# Patient Record
Sex: FEMALE | Race: BLACK OR AFRICAN AMERICAN | NOT HISPANIC OR LATINO | Employment: OTHER | URBAN - METROPOLITAN AREA
[De-identification: names, ages, dates, MRNs, and addresses within clinical notes are randomized per-mention and may not be internally consistent; named-entity substitution may affect disease eponyms.]

---

## 2017-01-06 PROBLEM — M10.062 ACUTE IDIOPATHIC GOUT OF LEFT KNEE: Status: ACTIVE | Noted: 2017-01-06

## 2017-01-06 PROBLEM — M1A.9XX0 CHRONIC GOUT: Status: ACTIVE | Noted: 2017-01-06

## 2017-01-06 PROBLEM — E11.9 TYPE 2 DIABETES MELLITUS WITHOUT COMPLICATION, WITHOUT LONG-TERM CURRENT USE OF INSULIN: Status: ACTIVE | Noted: 2017-01-06

## 2017-02-20 PROBLEM — E78.00 HIGH CHOLESTEROL: Status: ACTIVE | Noted: 2017-02-20

## 2017-02-20 PROBLEM — I10 HYPERTENSION: Status: ACTIVE | Noted: 2017-02-20

## 2017-02-27 PROBLEM — E11.9 TYPE 2 DIABETES MELLITUS WITHOUT COMPLICATION, WITHOUT LONG-TERM CURRENT USE OF INSULIN: Status: RESOLVED | Noted: 2017-01-06 | Resolved: 2017-02-27

## 2017-05-05 PROBLEM — E78.5 HYPERLIPIDEMIA: Status: ACTIVE | Noted: 2017-05-05

## 2017-05-05 PROBLEM — R80.9 PROTEINURIA: Status: ACTIVE | Noted: 2017-05-05

## 2017-10-27 ENCOUNTER — HOSPITAL ENCOUNTER (INPATIENT)
Facility: HOSPITAL | Age: 67
LOS: 3 days | Discharge: HOME | DRG: 690 | End: 2017-10-30
Attending: EMERGENCY MEDICINE | Admitting: HOSPITALIST
Payer: MEDICARE

## 2017-10-27 DIAGNOSIS — R10.9 ABDOMINAL PAIN: ICD-10-CM

## 2017-10-27 DIAGNOSIS — R07.9 CHEST PAIN: ICD-10-CM

## 2017-10-27 DIAGNOSIS — I24.9 ACS (ACUTE CORONARY SYNDROME): ICD-10-CM

## 2017-10-27 DIAGNOSIS — I10 ESSENTIAL HYPERTENSION: ICD-10-CM

## 2017-10-27 DIAGNOSIS — E11.9 TYPE 2 DIABETES MELLITUS WITHOUT COMPLICATION, WITHOUT LONG-TERM CURRENT USE OF INSULIN: ICD-10-CM

## 2017-10-27 DIAGNOSIS — N30.00 ACUTE CYSTITIS WITHOUT HEMATURIA: ICD-10-CM

## 2017-10-27 DIAGNOSIS — E86.0 DEHYDRATION: Primary | ICD-10-CM

## 2017-10-27 DIAGNOSIS — K21.9 GASTROESOPHAGEAL REFLUX DISEASE, ESOPHAGITIS PRESENCE NOT SPECIFIED: ICD-10-CM

## 2017-10-27 DIAGNOSIS — I24.9 ACUTE CORONARY SYNDROME: ICD-10-CM

## 2017-10-27 DIAGNOSIS — N17.9 ACUTE KIDNEY INJURY: ICD-10-CM

## 2017-10-27 LAB
ALBUMIN SERPL BCP-MCNC: 4 G/DL
ALP SERPL-CCNC: 80 U/L
ALT SERPL W/O P-5'-P-CCNC: 11 U/L
ANION GAP SERPL CALC-SCNC: 14 MMOL/L
AST SERPL-CCNC: 15 U/L
BACTERIA #/AREA URNS HPF: ABNORMAL /HPF
BASOPHILS # BLD AUTO: 0.03 K/UL
BASOPHILS NFR BLD: 0.3 %
BILIRUB SERPL-MCNC: 0.7 MG/DL
BILIRUB UR QL STRIP: NEGATIVE
BUN SERPL-MCNC: 52 MG/DL
CALCIUM SERPL-MCNC: 10.5 MG/DL
CHLORIDE SERPL-SCNC: 104 MMOL/L
CLARITY UR: ABNORMAL
CO2 SERPL-SCNC: 22 MMOL/L
COLOR UR: YELLOW
CREAT SERPL-MCNC: 1.7 MG/DL
DIFFERENTIAL METHOD: ABNORMAL
EOSINOPHIL # BLD AUTO: 0 K/UL
EOSINOPHIL NFR BLD: 0.1 %
ERYTHROCYTE [DISTWIDTH] IN BLOOD BY AUTOMATED COUNT: 14.2 %
EST. GFR  (AFRICAN AMERICAN): 35 ML/MIN/1.73 M^2
EST. GFR  (NON AFRICAN AMERICAN): 31 ML/MIN/1.73 M^2
GLUCOSE SERPL-MCNC: 150 MG/DL
GLUCOSE UR QL STRIP: NEGATIVE
HCT VFR BLD AUTO: 35.4 %
HGB BLD-MCNC: 12.2 G/DL
HGB UR QL STRIP: NEGATIVE
HYALINE CASTS #/AREA URNS LPF: 0 /LPF
KETONES UR QL STRIP: NEGATIVE
LEUKOCYTE ESTERASE UR QL STRIP: ABNORMAL
LIPASE SERPL-CCNC: 12 U/L
LYMPHOCYTES # BLD AUTO: 1.5 K/UL
LYMPHOCYTES NFR BLD: 16.1 %
MCH RBC QN AUTO: 29.8 PG
MCHC RBC AUTO-ENTMCNC: 34.5 G/DL
MCV RBC AUTO: 87 FL
MICROSCOPIC COMMENT: ABNORMAL
MONOCYTES # BLD AUTO: 1.1 K/UL
MONOCYTES NFR BLD: 11.5 %
NEUTROPHILS # BLD AUTO: 6.7 K/UL
NEUTROPHILS NFR BLD: 72 %
NITRITE UR QL STRIP: NEGATIVE
PH UR STRIP: 5 [PH] (ref 5–8)
PLATELET # BLD AUTO: 248 K/UL
PMV BLD AUTO: 10.8 FL
POCT GLUCOSE: 147 MG/DL (ref 70–110)
POTASSIUM SERPL-SCNC: 4 MMOL/L
PROT SERPL-MCNC: 7.9 G/DL
PROT UR QL STRIP: ABNORMAL
RBC # BLD AUTO: 4.09 M/UL
RBC #/AREA URNS HPF: 2 /HPF (ref 0–4)
SODIUM SERPL-SCNC: 140 MMOL/L
SP GR UR STRIP: 1.01 (ref 1–1.03)
SQUAMOUS #/AREA URNS HPF: 15 /HPF
TROPONIN I SERPL DL<=0.01 NG/ML-MCNC: 0.02 NG/ML
TROPONIN I SERPL DL<=0.01 NG/ML-MCNC: 0.03 NG/ML
TROPONIN I SERPL DL<=0.01 NG/ML-MCNC: 0.04 NG/ML
URN SPEC COLLECT METH UR: ABNORMAL
UROBILINOGEN UR STRIP-ACNC: NEGATIVE EU/DL
WBC # BLD AUTO: 9.37 K/UL
WBC #/AREA URNS HPF: 20 /HPF (ref 0–5)

## 2017-10-27 PROCEDURE — 63600175 PHARM REV CODE 636 W HCPCS: Performed by: HOSPITALIST

## 2017-10-27 PROCEDURE — 80053 COMPREHEN METABOLIC PANEL: CPT

## 2017-10-27 PROCEDURE — 99285 EMERGENCY DEPT VISIT HI MDM: CPT | Mod: 25

## 2017-10-27 PROCEDURE — 85025 COMPLETE CBC W/AUTO DIFF WBC: CPT

## 2017-10-27 PROCEDURE — 25000003 PHARM REV CODE 250: Performed by: EMERGENCY MEDICINE

## 2017-10-27 PROCEDURE — 63600175 PHARM REV CODE 636 W HCPCS: Performed by: EMERGENCY MEDICINE

## 2017-10-27 PROCEDURE — 36415 COLL VENOUS BLD VENIPUNCTURE: CPT

## 2017-10-27 PROCEDURE — 96375 TX/PRO/DX INJ NEW DRUG ADDON: CPT

## 2017-10-27 PROCEDURE — 83690 ASSAY OF LIPASE: CPT

## 2017-10-27 PROCEDURE — 25000003 PHARM REV CODE 250: Performed by: HOSPITALIST

## 2017-10-27 PROCEDURE — 93005 ELECTROCARDIOGRAM TRACING: CPT

## 2017-10-27 PROCEDURE — 93010 ELECTROCARDIOGRAM REPORT: CPT | Mod: ,,, | Performed by: INTERNAL MEDICINE

## 2017-10-27 PROCEDURE — 96365 THER/PROPH/DIAG IV INF INIT: CPT

## 2017-10-27 PROCEDURE — C9113 INJ PANTOPRAZOLE SODIUM, VIA: HCPCS | Performed by: HOSPITALIST

## 2017-10-27 PROCEDURE — 84484 ASSAY OF TROPONIN QUANT: CPT | Mod: 91

## 2017-10-27 PROCEDURE — 21400001 HC TELEMETRY ROOM

## 2017-10-27 PROCEDURE — 83036 HEMOGLOBIN GLYCOSYLATED A1C: CPT

## 2017-10-27 PROCEDURE — 87040 BLOOD CULTURE FOR BACTERIA: CPT | Mod: 59

## 2017-10-27 PROCEDURE — 96361 HYDRATE IV INFUSION ADD-ON: CPT

## 2017-10-27 PROCEDURE — 81000 URINALYSIS NONAUTO W/SCOPE: CPT

## 2017-10-27 RX ORDER — CARVEDILOL 6.25 MG/1
6.25 TABLET ORAL 2 TIMES DAILY
Status: DISCONTINUED | OUTPATIENT
Start: 2017-10-27 | End: 2017-10-30 | Stop reason: HOSPADM

## 2017-10-27 RX ORDER — PANTOPRAZOLE SODIUM 40 MG/10ML
40 INJECTION, POWDER, LYOPHILIZED, FOR SOLUTION INTRAVENOUS DAILY
Status: DISCONTINUED | OUTPATIENT
Start: 2017-10-27 | End: 2017-10-30 | Stop reason: HOSPADM

## 2017-10-27 RX ORDER — AMOXICILLIN 250 MG
1 CAPSULE ORAL DAILY
Status: DISCONTINUED | OUTPATIENT
Start: 2017-10-28 | End: 2017-10-30 | Stop reason: HOSPADM

## 2017-10-27 RX ORDER — SODIUM CHLORIDE 9 MG/ML
1000 INJECTION, SOLUTION INTRAVENOUS
Status: COMPLETED | OUTPATIENT
Start: 2017-10-27 | End: 2017-10-27

## 2017-10-27 RX ORDER — ALLOPURINOL 100 MG/1
300 TABLET ORAL DAILY
Status: DISCONTINUED | OUTPATIENT
Start: 2017-10-28 | End: 2017-10-30 | Stop reason: HOSPADM

## 2017-10-27 RX ORDER — INSULIN ASPART 100 [IU]/ML
0-5 INJECTION, SOLUTION INTRAVENOUS; SUBCUTANEOUS
Status: DISCONTINUED | OUTPATIENT
Start: 2017-10-27 | End: 2017-10-30 | Stop reason: HOSPADM

## 2017-10-27 RX ORDER — CIPROFLOXACIN 2 MG/ML
400 INJECTION, SOLUTION INTRAVENOUS
Status: DISCONTINUED | OUTPATIENT
Start: 2017-10-28 | End: 2017-10-30 | Stop reason: HOSPADM

## 2017-10-27 RX ORDER — ASPIRIN 325 MG
325 TABLET, DELAYED RELEASE (ENTERIC COATED) ORAL
Status: COMPLETED | OUTPATIENT
Start: 2017-10-27 | End: 2017-10-27

## 2017-10-27 RX ORDER — ACETAMINOPHEN 325 MG/1
650 TABLET ORAL EVERY 6 HOURS PRN
Status: DISCONTINUED | OUTPATIENT
Start: 2017-10-27 | End: 2017-10-30 | Stop reason: HOSPADM

## 2017-10-27 RX ORDER — SODIUM CHLORIDE 9 MG/ML
1000 INJECTION, SOLUTION INTRAVENOUS CONTINUOUS
Status: ACTIVE | OUTPATIENT
Start: 2017-10-27 | End: 2017-10-28

## 2017-10-27 RX ORDER — GLUCAGON 1 MG
1 KIT INJECTION
Status: DISCONTINUED | OUTPATIENT
Start: 2017-10-27 | End: 2017-10-30 | Stop reason: HOSPADM

## 2017-10-27 RX ORDER — PRAVASTATIN SODIUM 40 MG/1
40 TABLET ORAL DAILY
Status: DISCONTINUED | OUTPATIENT
Start: 2017-10-28 | End: 2017-10-30 | Stop reason: HOSPADM

## 2017-10-27 RX ORDER — ENOXAPARIN SODIUM 100 MG/ML
30 INJECTION SUBCUTANEOUS EVERY 24 HOURS
Status: DISCONTINUED | OUTPATIENT
Start: 2017-10-27 | End: 2017-10-30 | Stop reason: HOSPADM

## 2017-10-27 RX ORDER — IBUPROFEN 200 MG
16 TABLET ORAL
Status: DISCONTINUED | OUTPATIENT
Start: 2017-10-27 | End: 2017-10-30 | Stop reason: HOSPADM

## 2017-10-27 RX ORDER — ONDANSETRON 2 MG/ML
4 INJECTION INTRAMUSCULAR; INTRAVENOUS EVERY 6 HOURS PRN
Status: DISCONTINUED | OUTPATIENT
Start: 2017-10-27 | End: 2017-10-30 | Stop reason: HOSPADM

## 2017-10-27 RX ORDER — CIPROFLOXACIN 2 MG/ML
400 INJECTION, SOLUTION INTRAVENOUS
Status: COMPLETED | OUTPATIENT
Start: 2017-10-27 | End: 2017-10-27

## 2017-10-27 RX ORDER — RAMELTEON 8 MG/1
8 TABLET ORAL NIGHTLY PRN
Status: DISCONTINUED | OUTPATIENT
Start: 2017-10-27 | End: 2017-10-30 | Stop reason: HOSPADM

## 2017-10-27 RX ORDER — DICYCLOMINE HYDROCHLORIDE 10 MG/1
10 CAPSULE ORAL 4 TIMES DAILY
Status: DISCONTINUED | OUTPATIENT
Start: 2017-10-28 | End: 2017-10-30 | Stop reason: HOSPADM

## 2017-10-27 RX ORDER — POLYETHYLENE GLYCOL 3350 17 G/17G
17 POWDER, FOR SOLUTION ORAL DAILY
Status: DISCONTINUED | OUTPATIENT
Start: 2017-10-28 | End: 2017-10-30 | Stop reason: HOSPADM

## 2017-10-27 RX ORDER — IBUPROFEN 200 MG
24 TABLET ORAL
Status: DISCONTINUED | OUTPATIENT
Start: 2017-10-27 | End: 2017-10-30 | Stop reason: HOSPADM

## 2017-10-27 RX ORDER — ONDANSETRON 2 MG/ML
4 INJECTION INTRAMUSCULAR; INTRAVENOUS
Status: COMPLETED | OUTPATIENT
Start: 2017-10-27 | End: 2017-10-27

## 2017-10-27 RX ADMIN — PANTOPRAZOLE SODIUM 40 MG: 40 INJECTION, POWDER, FOR SOLUTION INTRAVENOUS at 08:10

## 2017-10-27 RX ADMIN — ASPIRIN 325 MG: 325 TABLET, DELAYED RELEASE ORAL at 08:10

## 2017-10-27 RX ADMIN — CIPROFLOXACIN 400 MG: 2 INJECTION, SOLUTION INTRAVENOUS at 08:10

## 2017-10-27 RX ADMIN — SODIUM CHLORIDE 1000 ML: 0.9 INJECTION, SOLUTION INTRAVENOUS at 08:10

## 2017-10-27 RX ADMIN — ONDANSETRON 4 MG: 2 INJECTION, SOLUTION INTRAMUSCULAR; INTRAVENOUS at 06:10

## 2017-10-27 RX ADMIN — ENOXAPARIN SODIUM 30 MG: 100 INJECTION SUBCUTANEOUS at 04:10

## 2017-10-27 RX ADMIN — DICYCLOMINE HYDROCHLORIDE 10 MG: 10 CAPSULE ORAL at 11:10

## 2017-10-27 RX ADMIN — CARVEDILOL 6.25 MG: 6.25 TABLET, FILM COATED ORAL at 08:10

## 2017-10-27 RX ADMIN — SODIUM CHLORIDE 1000 ML: 0.9 INJECTION, SOLUTION INTRAVENOUS at 06:10

## 2017-10-27 RX ADMIN — ONDANSETRON 4 MG: 2 INJECTION, SOLUTION INTRAMUSCULAR; INTRAVENOUS at 04:10

## 2017-10-27 RX ADMIN — SODIUM CHLORIDE 1000 ML: 0.9 INJECTION, SOLUTION INTRAVENOUS at 10:10

## 2017-10-27 NOTE — ED NOTES
Reported from Francine WILDER . Pt is lying in bed, AAOx3, placed on continuous cardiac monitoring, pulse O2, blood pressure, side rails upx2. Will continue to monitor.

## 2017-10-27 NOTE — ED PROVIDER NOTES
Encounter Date: 10/27/2017    SCRIBE #1 NOTE: I, Herson Selena, am scribing for, and in the presence of, Chevy Farias MD. Other sections scribed: HPI, ROS, PE.       History     Chief Complaint   Patient presents with    Abdominal Pain     nausea and vomiting x 1 week, reports generalized abdominal pain, denies diarrhea     CC: Abdominal Pain  HPI: This 67 y.o. female with Hx of HTN, HLD, DM, CAD presents to the ED c/o severe intermittent upper abdominal pain for the past week. Pt states that she was seen at an urgent care center for this 5 days ago and was told she was constipated; she reports having XRs done, but no blood work. She reports having normal non-bloody BMs after taking Dulcolax as directed, but she continues to have the pain. Pt states that she has been feeling nauseous and vomits after she eats, drinks, or takes her medications (has not taken any in 4-5 days). She states that she has not been eating or drinking for the past few days. She reports episodes of bilious non-bloody emesis 5 days ago, 4 days ago, and then again this morning. She denies fever, diarrhea, dysuria, chest pain.      The history is provided by the patient.     Review of patient's allergies indicates:   Allergen Reactions    Codeine      Not sure of reaction    Colchicine analogues Nausea And Vomiting    Doxycycline      Not sure of reaction    Equagesic [meprobamate-aspirin]      hyperactivity    Indocin [indomethacin sodium]      Not sure of reaction    Penicillins      Not sure of reaction    Tramadol Nausea And Vomiting    Vicodin [hydrocodone-acetaminophen]      Not sure of reaction     Past Medical History:   Diagnosis Date    Diabetes mellitus     Heart attack     2001, 2006    High cholesterol     Hypertension      Past Surgical History:   Procedure Laterality Date    BREAST SURGERY      HYSTERECTOMY      THYROID CYST EXCISION       History reviewed. No pertinent family history.  Social History    Substance Use Topics    Smoking status: Never Smoker    Smokeless tobacco: Never Used    Alcohol use Yes      Comment: occasional     Review of Systems   Constitutional: Positive for appetite change. Negative for chills and fever.   HENT: Negative for sinus pain and sore throat.    Eyes: Negative for pain and visual disturbance.   Respiratory: Negative for cough and shortness of breath.    Cardiovascular: Negative for chest pain.   Gastrointestinal: Positive for abdominal pain, nausea and vomiting. Negative for blood in stool, constipation and diarrhea.   Genitourinary: Negative for difficulty urinating, dysuria and hematuria.   Musculoskeletal: Negative for arthralgias and myalgias.   Skin: Negative for rash and wound.   Neurological: Negative for syncope and headaches.       Physical Exam     Initial Vitals [10/27/17 0545]   BP Pulse Resp Temp SpO2   (!) 167/71 99 18 98.8 °F (37.1 °C) 99 %      MAP       103         Physical Exam    Nursing note and vitals reviewed.  Constitutional: She appears well-developed and well-nourished. She is not diaphoretic. No distress.   HENT:   Head: Normocephalic and atraumatic.   Eyes: EOM are normal.   Neck: Normal range of motion.   Cardiovascular: Normal rate and regular rhythm.   Pulmonary/Chest: Breath sounds normal. No respiratory distress.   Abdominal: Soft. Bowel sounds are normal. She exhibits no distension and no mass. There is no tenderness. There is no rebound and no guarding.   Musculoskeletal: Normal range of motion. She exhibits no edema.   Neurological: She is alert.   Skin: Skin is warm and dry. No rash noted.   Psychiatric: Thought content normal.         ED Course   Procedures  Labs Reviewed   CBC W/ AUTO DIFFERENTIAL - Abnormal; Notable for the following:        Result Value    Hematocrit 35.4 (*)     Mono # 1.1 (*)     Lymph% 16.1 (*)     All other components within normal limits   COMPREHENSIVE METABOLIC PANEL - Abnormal; Notable for the following:      CO2 22 (*)     Glucose 150 (*)     BUN, Bld 52 (*)     Creatinine 1.7 (*)     eGFR if  35 (*)     eGFR if non  31 (*)     All other components within normal limits   TROPONIN I - Abnormal; Notable for the following:     Troponin I 0.039 (*)     All other components within normal limits   URINALYSIS - Abnormal; Notable for the following:     Appearance, UA Cloudy (*)     Protein, UA 1+ (*)     Leukocytes, UA 2+ (*)     All other components within normal limits   URINALYSIS MICROSCOPIC - Abnormal; Notable for the following:     WBC, UA 20 (*)     Bacteria, UA Many (*)     All other components within normal limits   CULTURE, BLOOD   CULTURE, BLOOD   LIPASE             Medical Decision Making:   Differential Diagnosis:   67-year-old black female history of diabetes gout hypercholesterolemia MI ×2 no stents no recent echoes or stress test presents to the ER complaining of epigastric burning pain nausea vomiting ×2 today this is been on for 5 daysspecific chest pain or short of breath no fevers, limits of the normal workup in the ER reveals a urinary tract infection of glucose of 150 BUN of 52 creatinine 1.7 which is new for her potassium of 4.0 troponin is elevated to 0.039 EKG shows a normal sinus rhythm at 89 with LVH with strain admitted the patient to Dr. pressure, for acute coronary syndrome chest pain rule out MI urinary tract infection dehydration nausea vomiting and acute kidney injury she is here and hemodynamically stable at time of admission she received 325 mg aspirin by mouth 400 mg of Cipro IV and blood cultures were done            Scribe Attestation:   Scribe #1: I performed the above scribed service and the documentation accurately describes the services I performed. I attest to the accuracy of the note.    Attending Attestation:           Physician Attestation for Scribe:  Physician Attestation Statement for Scribe #1: I, Chevy Farias MD, reviewed documentation, as  scribed by Herson Walters in my presence, and it is both accurate and complete.                 ED Course      Clinical Impression:   The primary encounter diagnosis was Dehydration. Diagnoses of Abdominal pain, Chest pain, Acute cystitis without hematuria, Acute coronary syndrome, Acute kidney injury, and ACS (acute coronary syndrome) were also pertinent to this visit.                           Chevy Farias MD  10/27/17 4427

## 2017-10-27 NOTE — PLAN OF CARE
Problem: Diabetes, Type 2 (Adult)  Goal: Signs and Symptoms of Listed Potential Problems Will be Absent, Minimized or Managed (Diabetes, Type 2)  Signs and symptoms of listed potential problems will be absent, minimized or managed by discharge/transition of care (reference Diabetes, Type 2 (Adult) CPG).   Outcome: Ongoing (interventions implemented as appropriate)   10/27/17 1701   Diabetes, Type 2   Problems Assessed (Type 2 Diabetes) all   Problems Present (Type 2 Diabetes) none       Problem: Patient Care Overview  Goal: Plan of Care Review  Outcome: Ongoing (interventions implemented as appropriate)   10/27/17 1701   Coping/Psychosocial   Plan Of Care Reviewed With patient       Problem: Nausea/Vomiting (Adult)  Goal: Identify Related Risk Factors and Signs and Symptoms  Related risk factors and signs and symptoms are identified upon initiation of Human Response Clinical Practice Guideline (CPG)  Outcome: Ongoing (interventions implemented as appropriate)   10/27/17 1701   Nausea/Vomiting   Related Risk Factors (Nausea/Vomiting) gastrointestinal dysfunction   Signs and Symptoms (Nausea/Vomiting) abdominal discomfort/pain;report of queasy sensation;retching/gagging;weakness;food aversion;report of emesis     Goal: Symptom Relief  Patient will demonstrate the desired outcomes by discharge/transition of care.  Outcome: Ongoing (interventions implemented as appropriate)   10/27/17 1701   Nausea/Vomiting (Adult)   Symptom Relief making progress toward outcome     Goal: Adequate Hydration  Patient will demonstrate the desired outcomes by discharge/transition of care.  Outcome: Ongoing (interventions implemented as appropriate)   10/27/17 1701   Nausea/Vomiting (Adult)   Adequate Hydration making progress toward outcome

## 2017-10-27 NOTE — NURSING
Patient arrived to room from ER via weelchair, by transport staff. NADN. Oriented patient to room, unit, and call light. Telemetry monitor in place. Patient denies any needs at this time. Will continue to monitor

## 2017-10-28 PROBLEM — E86.0 DEHYDRATION: Status: ACTIVE | Noted: 2017-10-28

## 2017-10-28 LAB
ANION GAP SERPL CALC-SCNC: 6 MMOL/L
BASOPHILS # BLD AUTO: 0.03 K/UL
BASOPHILS NFR BLD: 0.3 %
BUN SERPL-MCNC: 28 MG/DL
CALCIUM SERPL-MCNC: 9 MG/DL
CHLORIDE SERPL-SCNC: 116 MMOL/L
CO2 SERPL-SCNC: 24 MMOL/L
CREAT SERPL-MCNC: 1.3 MG/DL
DIFFERENTIAL METHOD: ABNORMAL
EOSINOPHIL # BLD AUTO: 0 K/UL
EOSINOPHIL NFR BLD: 0.1 %
ERYTHROCYTE [DISTWIDTH] IN BLOOD BY AUTOMATED COUNT: 14.3 %
EST. GFR  (AFRICAN AMERICAN): 49 ML/MIN/1.73 M^2
EST. GFR  (NON AFRICAN AMERICAN): 43 ML/MIN/1.73 M^2
GLUCOSE SERPL-MCNC: 107 MG/DL
HCT VFR BLD AUTO: 28 %
HGB BLD-MCNC: 9.3 G/DL
LYMPHOCYTES # BLD AUTO: 1.9 K/UL
LYMPHOCYTES NFR BLD: 18.3 %
MCH RBC QN AUTO: 30.1 PG
MCHC RBC AUTO-ENTMCNC: 33.2 G/DL
MCV RBC AUTO: 91 FL
MONOCYTES # BLD AUTO: 1.1 K/UL
MONOCYTES NFR BLD: 10 %
NEUTROPHILS # BLD AUTO: 7.5 K/UL
NEUTROPHILS NFR BLD: 71.1 %
PLATELET # BLD AUTO: 187 K/UL
PMV BLD AUTO: 10.8 FL
POCT GLUCOSE: 105 MG/DL (ref 70–110)
POCT GLUCOSE: 106 MG/DL (ref 70–110)
POCT GLUCOSE: 113 MG/DL (ref 70–110)
POCT GLUCOSE: 125 MG/DL (ref 70–110)
POTASSIUM SERPL-SCNC: 4.9 MMOL/L
RBC # BLD AUTO: 3.09 M/UL
SODIUM SERPL-SCNC: 146 MMOL/L
WBC # BLD AUTO: 10.59 K/UL

## 2017-10-28 PROCEDURE — 21400001 HC TELEMETRY ROOM

## 2017-10-28 PROCEDURE — 87086 URINE CULTURE/COLONY COUNT: CPT

## 2017-10-28 PROCEDURE — 36415 COLL VENOUS BLD VENIPUNCTURE: CPT

## 2017-10-28 PROCEDURE — 63600175 PHARM REV CODE 636 W HCPCS: Performed by: HOSPITALIST

## 2017-10-28 PROCEDURE — 25000003 PHARM REV CODE 250: Performed by: HOSPITALIST

## 2017-10-28 PROCEDURE — C9113 INJ PANTOPRAZOLE SODIUM, VIA: HCPCS | Performed by: HOSPITALIST

## 2017-10-28 PROCEDURE — 63600175 PHARM REV CODE 636 W HCPCS: Performed by: EMERGENCY MEDICINE

## 2017-10-28 PROCEDURE — 85025 COMPLETE CBC W/AUTO DIFF WBC: CPT

## 2017-10-28 PROCEDURE — 80048 BASIC METABOLIC PNL TOTAL CA: CPT

## 2017-10-28 RX ADMIN — ALLOPURINOL 300 MG: 100 TABLET ORAL at 10:10

## 2017-10-28 RX ADMIN — PRAVASTATIN SODIUM 40 MG: 40 TABLET ORAL at 10:10

## 2017-10-28 RX ADMIN — DICYCLOMINE HYDROCHLORIDE 10 MG: 10 CAPSULE ORAL at 06:10

## 2017-10-28 RX ADMIN — ENOXAPARIN SODIUM 30 MG: 100 INJECTION SUBCUTANEOUS at 05:10

## 2017-10-28 RX ADMIN — PANTOPRAZOLE SODIUM 40 MG: 40 INJECTION, POWDER, FOR SOLUTION INTRAVENOUS at 10:10

## 2017-10-28 RX ADMIN — CIPROFLOXACIN 400 MG: 2 INJECTION, SOLUTION INTRAVENOUS at 10:10

## 2017-10-28 RX ADMIN — DICYCLOMINE HYDROCHLORIDE 10 MG: 10 CAPSULE ORAL at 01:10

## 2017-10-28 RX ADMIN — CARVEDILOL 6.25 MG: 6.25 TABLET, FILM COATED ORAL at 10:10

## 2017-10-28 RX ADMIN — POLYETHYLENE GLYCOL 3350 17 G: 17 POWDER, FOR SOLUTION ORAL at 10:10

## 2017-10-28 RX ADMIN — DOCUSATE SODIUM AND SENNOSIDES 1 TABLET: 8.6; 5 TABLET, FILM COATED ORAL at 10:10

## 2017-10-28 RX ADMIN — DICYCLOMINE HYDROCHLORIDE 10 MG: 10 CAPSULE ORAL at 11:10

## 2017-10-28 RX ADMIN — CARVEDILOL 6.25 MG: 6.25 TABLET, FILM COATED ORAL at 09:10

## 2017-10-28 RX ADMIN — DICYCLOMINE HYDROCHLORIDE 10 MG: 10 CAPSULE ORAL at 05:10

## 2017-10-28 NOTE — PLAN OF CARE
Problem: Nausea/Vomiting (Adult)  Intervention: Minimize Nausea Triggers/Manage Symptoms   10/27/17 1030 10/28/17 0648   Coping/Psychosocial Interventions   Environmental Support --  calm environment promoted   Monitor/Manage Hypovolemia   Nausea/Vomiting Interventions sips clear liquids given --      Intervention: Promote/Maintain Hydration   10/28/17 0648   Nutrition Interventions   Fluid/Electrolyte Management electrolyte binding therapy initiated;fluids provided         Comments: Pt admitted with abd pain she has not had any nausea or vomiting, since admission.pt given bentyl as ordered.iv fluids was given as ordered. Pt npo at present.

## 2017-10-28 NOTE — PROGRESS NOTES
[facility] Hospital Medicine  Progress Note    Patient Name: Ninfa Philip  MRN: [id number]  Patient Class: IP- Inpatient   Admission Date: 10/27/2017  Length of Stay: 1 days  Attending Physician: Melita Blank MD  Primary Care Provider: Jt Sharif MD        Subjective:     Principal Problem:Acute cystitis without hematuria    HPI:  68 yo female with DM2, HTN, HLP and chronic gout presented with epigastric pain, N/V x 2 days. She came to ED and xray showed retained stool so she took dulcolax and had BM but still has 8/10 pain. She reports pain is sharp and when trying to eat she feels like food is getting stuck in lower chest. She reports decreased appetite and thinks her stool maybe darker than usual. No great historian of information. On admit, she appears to have UTI but denies dysuria, fever and chills. She has normal WBC, elevated Cr 1.7. Abdominal Xray did not show obstruction. She was admitted with UTI and Gi consultation .     Hospital Course:  Pt admitted with UTI and abdominal pain. Improved with bentyl. GI to do EGD on Monday 10/30.  Diet advanced as tolerated. Cipro until culture results.     Interval History: pt feels much better and abd pain improved, no vomiting   Review of Systems   Constitutional: Positive for appetite change.   HENT: Negative.    Eyes: Negative.    Respiratory: Negative.    Cardiovascular: Negative.    Gastrointestinal: Positive for abdominal pain, nausea and vomiting.   Endocrine: Negative.    Genitourinary: Negative.    Musculoskeletal: Negative.    Neurological: Negative.    Psychiatric/Behavioral: Negative.      Objective:     Vital Signs (Most Recent):  Temp: 98 °F (36.7 °C) (10/28/17 1135)  Pulse: 66 (10/28/17 1135)  Resp: 18 (10/28/17 1135)  BP: (!) 160/74 (10/28/17 1250)  SpO2: 98 % (10/28/17 1135) Vital Signs (24h Range):  Temp:  [96.3 °F (35.7 °C)-98.8 °F (37.1 °C)] 98 °F (36.7 °C)  Pulse:  [63-89] 66  Resp:  [16-18] 18  SpO2:  [96 %-98 %]  98 %  BP: (142-182)/(70-82) 160/74     Weight: 59.5 kg (131 lb 2.8 oz)  Body mass index is 21.17 kg/m².    Intake/Output Summary (Last 24 hours) at 10/28/17 1637  Last data filed at 10/28/17 1037   Gross per 24 hour   Intake             1420 ml   Output             1100 ml   Net              320 ml      Physical Exam   Constitutional: She is oriented to person, place, and time. She appears well-developed and well-nourished. No distress.   HENT:   Head: Normocephalic and atraumatic.   Mouth/Throat: Oropharynx is clear and moist.   Eyes: Conjunctivae and EOM are normal.   Neck: Normal range of motion. Neck supple. No JVD present.   Cardiovascular: Normal rate, regular rhythm, normal heart sounds and intact distal pulses.    Pulmonary/Chest: Effort normal and breath sounds normal. No respiratory distress.   Abdominal: Soft. Bowel sounds are normal. She exhibits no distension. There is tenderness. There is no rebound and no guarding.   Musculoskeletal: Normal range of motion. She exhibits no edema or deformity.   Neurological: She is alert and oriented to person, place, and time.   Skin: Skin is warm and dry. Capillary refill takes less than 2 seconds. No erythema.   Psychiatric: She has a normal mood and affect. Her behavior is normal.       Significant Labs:   BMP:   Recent Labs  Lab 10/28/17  0445      *   K 4.9   *   CO2 24   BUN 28*   CREATININE 1.3   CALCIUM 9.0     CBC:   Recent Labs  Lab 10/27/17  0555 10/28/17  0445   WBC 9.37 10.59   HGB 12.2 9.3*   HCT 35.4* 28.0*    187     POCT Glucose:   Recent Labs  Lab 10/27/17  2025 10/28/17  0728 10/28/17  1133   POCTGLUCOSE 147* 106 125*       Significant Imaging: I have reviewed all pertinent imaging results/findings within the past 24 hours.    Assessment/Plan:      * Acute cystitis without hematuria    Urine culture pending  IV cipro           Abdominal pain    Clear liquid diet advance as tolerated   IVF  PPI   Bentyl  ?gastroparesis vs  esophageal dysmotility  GI cosnsult - egd 10/30          Acute kidney injury    Likely dehydration and will give IVF   BMp in am           ACS (acute coronary syndrome)              Hyperlipidemia    Resume statin           Hypertension    No acute issues  Resume home medications           Chronic gout    No acute issues  Allopurinol           Type 2 diabetes mellitus without complication, without long-term current use of insulin    Hold oral agents  SSi   Hemoglobin A1c in am             VTE Risk Mitigation         Ordered     enoxaparin injection 30 mg  Daily     Route:  Subcutaneous        10/27/17 1013     Medium Risk of VTE  Once      10/27/17 1013              Melita Blank MD  Department of Hospital Medicine   [facility]

## 2017-10-28 NOTE — HOSPITAL COURSE
Pt admitted with UTI and abdominal pain. Improved with bentyl. GI to do EGD on Monday 10/30.  Diet advanced as tolerated. Plan for EGD in am. Cipro for uti though culture is negative, UA was strongly positive.     10/30 - EGD:  - Non-obstructing Schatzki ring. Dilated.                       - Erythematous mucosa in the antrum. Biopsied.                       - Non-bleeding gastric ulcer with no stigmata of                        bleeding.                       - Normal examined duodenum.    Pt will continue on PPI BID and follow up GI for biopsy results and repeat EGD.

## 2017-10-28 NOTE — ASSESSMENT & PLAN NOTE
Clear liquid diet advance as tolerated   IVF  PPI   Bentyl  ?gastroparesis vs esophageal dysmotility  GI cosnsult - egd 10/30

## 2017-10-28 NOTE — SUBJECTIVE & OBJECTIVE
Past Medical History:   Diagnosis Date    Diabetes mellitus     Heart attack     2001, 2006    High cholesterol     Hypertension        Past Surgical History:   Procedure Laterality Date    BREAST SURGERY      HYSTERECTOMY      THYROID CYST EXCISION         Review of patient's allergies indicates:   Allergen Reactions    Codeine      Not sure of reaction    Colchicine analogues Nausea And Vomiting    Doxycycline      Not sure of reaction    Equagesic [meprobamate-aspirin]      hyperactivity    Indocin [indomethacin sodium]      Not sure of reaction    Penicillins      Not sure of reaction    Tramadol Nausea And Vomiting    Vicodin [hydrocodone-acetaminophen]      Not sure of reaction       No current facility-administered medications on file prior to encounter.      Current Outpatient Prescriptions on File Prior to Encounter   Medication Sig    allopurinol (ZYLOPRIM) 300 MG tablet Take 1 tablet (300 mg total) by mouth once daily.    aspirin (ECOTRIN) 81 MG EC tablet Take 81 mg by mouth once daily.    carvedilol (COREG) 6.25 MG tablet Take 6.25 mg by mouth 2 (two) times daily.    glipiZIDE (GLUCOTROL) 2.5 MG TR24 Take 2.5 mg by mouth daily with breakfast.     lisinopril (PRINIVIL,ZESTRIL) 40 MG tablet Take 40 mg by mouth once daily.    pravastatin (PRAVACHOL) 40 MG tablet Take 40 mg by mouth once daily.    bumetanide (BUMEX) 0.5 MG Tab 0.5 mg po q am PRN swelling    ketorolac (TORADOL) 10 mg tablet Take 1 tablet (10 mg total) by mouth every 6 (six) hours as needed for Pain.     Family History     None        Social History Main Topics    Smoking status: Never Smoker    Smokeless tobacco: Never Used    Alcohol use Yes      Comment: occasional    Drug use: No    Sexual activity: Not on file     Review of Systems   Constitutional: Positive for appetite change.   HENT: Negative.    Eyes: Negative.    Respiratory: Negative.    Cardiovascular: Negative.    Gastrointestinal: Positive for  abdominal pain, nausea and vomiting.   Endocrine: Negative.    Genitourinary: Negative.    Musculoskeletal: Negative.    Neurological: Negative.    Psychiatric/Behavioral: Negative.      Objective:     Vital Signs (Most Recent):  Temp: 97.5 °F (36.4 °C) (10/27/17 1930)  Pulse: 84 (10/27/17 1930)  Resp: 18 (10/27/17 1930)  BP: (!) 157/72 (10/27/17 1930)  SpO2: 98 % (10/27/17 1930) Vital Signs (24h Range):  Temp:  [97.5 °F (36.4 °C)-98.8 °F (37.1 °C)] 97.5 °F (36.4 °C)  Pulse:  [76-99] 84  Resp:  [18-19] 18  SpO2:  [97 %-100 %] 98 %  BP: (142-193)/(71-87) 157/72     Weight: 63.5 kg (140 lb)  Body mass index is 22.6 kg/m².    Physical Exam   Constitutional: She is oriented to person, place, and time. She appears well-developed and well-nourished. No distress.   HENT:   Head: Normocephalic and atraumatic.   Mouth/Throat: Oropharynx is clear and moist.   Eyes: Conjunctivae and EOM are normal.   Neck: Normal range of motion. Neck supple. No JVD present.   Cardiovascular: Normal rate, regular rhythm, normal heart sounds and intact distal pulses.    Pulmonary/Chest: Effort normal and breath sounds normal. No respiratory distress.   Abdominal: Soft. Bowel sounds are normal. She exhibits no distension. There is tenderness. There is no rebound and no guarding.   Musculoskeletal: Normal range of motion. She exhibits no edema or deformity.   Neurological: She is alert and oriented to person, place, and time.   Skin: Skin is warm and dry. Capillary refill takes less than 2 seconds. No erythema.   Psychiatric: She has a normal mood and affect. Her behavior is normal.        Significant Labs:   Blood Culture:   Recent Labs  Lab 10/27/17  0825 10/27/17  0840   LABBLOO No Growth to date No Growth to date     CBC:   Recent Labs  Lab 10/27/17  0555   WBC 9.37   HGB 12.2   HCT 35.4*        CMP:   Recent Labs  Lab 10/27/17  0555      K 4.0      CO2 22*   *   BUN 52*   CREATININE 1.7*   CALCIUM 10.5   PROT 7.9    ALBUMIN 4.0   BILITOT 0.7   ALKPHOS 80   AST 15   ALT 11   ANIONGAP 14   EGFRNONAA 31*     Cardiac Markers: No results for input(s): CKMB, MYOGLOBIN, BNP, TROPISTAT in the last 48 hours.  Lipase:   Recent Labs  Lab 10/27/17  0555   LIPASE 12     POCT Glucose: No results for input(s): POCTGLUCOSE in the last 48 hours.  Troponin:   Recent Labs  Lab 10/27/17  0555 10/27/17  1140 10/27/17  1736   TROPONINI 0.039* 0.028* 0.019     Urine Culture: No results for input(s): LABURIN in the last 48 hours.  Urine Studies:   Recent Labs  Lab 10/27/17  0630   COLORU Yellow   APPEARANCEUA Cloudy*   PHUR 5.0   SPECGRAV 1.010   PROTEINUA 1+*   GLUCUA Negative   KETONESU Negative   BILIRUBINUA Negative   OCCULTUA Negative   NITRITE Negative   UROBILINOGEN Negative   LEUKOCYTESUR 2+*   RBCUA 2   WBCUA 20*   BACTERIA Many*   SQUAMEPITHEL 15   HYALINECASTS 0       Significant Imaging: I have reviewed all pertinent imaging results/findings within the past 24 hours.

## 2017-10-28 NOTE — NURSING
Bedside report by night nurse. No acute distress. Denies pain and discomfort. NPO status re-iterated. SRx2, bed locked in lowest position, communication board updated, and plan of care reviewed.

## 2017-10-28 NOTE — PLAN OF CARE
Problem: Nausea/Vomiting (Adult)  Intervention: Minimize Nausea Triggers/Manage Symptoms   10/28/17 1648   Coping/Psychosocial Interventions   Environmental Support calm environment promoted;environmental consistency promoted   Monitor/Manage Hypovolemia   Nausea/Vomiting Interventions sips clear liquids given     Intervention: Position to Prevent Aspiration   10/28/17 0726 10/28/17 1503   Positioning   Head of Bed (HOB) HOB at 30 degrees --    Body Position --  positioned/repositioned independently     Intervention: Promote/Maintain Hydration   10/28/17 1648   Nutrition Interventions   Fluid/Electrolyte Management fluids provided       Goal: Identify Related Risk Factors and Signs and Symptoms  Related risk factors and signs and symptoms are identified upon initiation of Human Response Clinical Practice Guideline (CPG)   Outcome: Ongoing (interventions implemented as appropriate)   10/28/17 1648   Nausea/Vomiting   Related Risk Factors (Nausea/Vomiting) gastrointestinal dysfunction   Signs and Symptoms (Nausea/Vomiting) abdominal discomfort/pain     Goal: Symptom Relief  Patient will demonstrate the desired outcomes by discharge/transition of care.   Outcome: Ongoing (interventions implemented as appropriate)   10/28/17 1648   Nausea/Vomiting (Adult)   Symptom Relief making progress toward outcome     Goal: Adequate Hydration  Patient will demonstrate the desired outcomes by discharge/transition of care.   Outcome: Ongoing (interventions implemented as appropriate)   10/28/17 1648   Nausea/Vomiting (Adult)   Adequate Hydration making progress toward outcome

## 2017-10-28 NOTE — H&P
[facility] Medicine  History & Physical    Patient Name: Ninfa Philip  MRN: [id number]  Admission Date: 10/27/2017  Attending Physician: Melita Blank MD   Primary Care Provider: Jt Sharif MD         Patient information was obtained from patient and ER records.     Subjective:     Principal Problem:Acute cystitis without hematuria    Chief Complaint:   Chief Complaint   Patient presents with    Abdominal Pain     nausea and vomiting x 1 week, reports generalized abdominal pain, denies diarrhea        HPI: 66 yo female with DM2, HTN, HLP and chronic gout presented with epigastric pain, N/V x 2 days. She came to ED and xray showed retained stool so she took dulcolax and had BM but still has 8/10 pain. She reports pain is sharp and when trying to eat she feels like food is getting stuck in lower chest. She reports decreased appetite and thinks her stool maybe darker than usual. No great historian of information. On admit, she appears to have UTI but denies dysuria, fever and chills. She has normal WBC, elevated Cr 1.7. Abdominal Xray did not show obstruction. She was admitted with UTI and Gi consultation .     Past Medical History:   Diagnosis Date    Diabetes mellitus     Heart attack     2001, 2006    High cholesterol     Hypertension        Past Surgical History:   Procedure Laterality Date    BREAST SURGERY      HYSTERECTOMY      THYROID CYST EXCISION         Review of patient's allergies indicates:   Allergen Reactions    Codeine      Not sure of reaction    Colchicine analogues Nausea And Vomiting    Doxycycline      Not sure of reaction    Equagesic [meprobamate-aspirin]      hyperactivity    Indocin [indomethacin sodium]      Not sure of reaction    Penicillins      Not sure of reaction    Tramadol Nausea And Vomiting    Vicodin [hydrocodone-acetaminophen]      Not sure of reaction       No current facility-administered medications on file prior to  encounter.      Current Outpatient Prescriptions on File Prior to Encounter   Medication Sig    allopurinol (ZYLOPRIM) 300 MG tablet Take 1 tablet (300 mg total) by mouth once daily.    aspirin (ECOTRIN) 81 MG EC tablet Take 81 mg by mouth once daily.    carvedilol (COREG) 6.25 MG tablet Take 6.25 mg by mouth 2 (two) times daily.    glipiZIDE (GLUCOTROL) 2.5 MG TR24 Take 2.5 mg by mouth daily with breakfast.     lisinopril (PRINIVIL,ZESTRIL) 40 MG tablet Take 40 mg by mouth once daily.    pravastatin (PRAVACHOL) 40 MG tablet Take 40 mg by mouth once daily.    bumetanide (BUMEX) 0.5 MG Tab 0.5 mg po q am PRN swelling    ketorolac (TORADOL) 10 mg tablet Take 1 tablet (10 mg total) by mouth every 6 (six) hours as needed for Pain.     Family History     None        Social History Main Topics    Smoking status: Never Smoker    Smokeless tobacco: Never Used    Alcohol use Yes      Comment: occasional    Drug use: No    Sexual activity: Not on file     Review of Systems   Constitutional: Positive for appetite change.   HENT: Negative.    Eyes: Negative.    Respiratory: Negative.    Cardiovascular: Negative.    Gastrointestinal: Positive for abdominal pain, nausea and vomiting.   Endocrine: Negative.    Genitourinary: Negative.    Musculoskeletal: Negative.    Neurological: Negative.    Psychiatric/Behavioral: Negative.      Objective:     Vital Signs (Most Recent):  Temp: 97.5 °F (36.4 °C) (10/27/17 1930)  Pulse: 84 (10/27/17 1930)  Resp: 18 (10/27/17 1930)  BP: (!) 157/72 (10/27/17 1930)  SpO2: 98 % (10/27/17 1930) Vital Signs (24h Range):  Temp:  [97.5 °F (36.4 °C)-98.8 °F (37.1 °C)] 97.5 °F (36.4 °C)  Pulse:  [76-99] 84  Resp:  [18-19] 18  SpO2:  [97 %-100 %] 98 %  BP: (142-193)/(71-87) 157/72     Weight: 63.5 kg (140 lb)  Body mass index is 22.6 kg/m².    Physical Exam   Constitutional: She is oriented to person, place, and time. She appears well-developed and well-nourished. No distress.   HENT:   Head:  Normocephalic and atraumatic.   Mouth/Throat: Oropharynx is clear and moist.   Eyes: Conjunctivae and EOM are normal.   Neck: Normal range of motion. Neck supple. No JVD present.   Cardiovascular: Normal rate, regular rhythm, normal heart sounds and intact distal pulses.    Pulmonary/Chest: Effort normal and breath sounds normal. No respiratory distress.   Abdominal: Soft. Bowel sounds are normal. She exhibits no distension. There is tenderness. There is no rebound and no guarding.   Musculoskeletal: Normal range of motion. She exhibits no edema or deformity.   Neurological: She is alert and oriented to person, place, and time.   Skin: Skin is warm and dry. Capillary refill takes less than 2 seconds. No erythema.   Psychiatric: She has a normal mood and affect. Her behavior is normal.        Significant Labs:   Blood Culture:   Recent Labs  Lab 10/27/17  0825 10/27/17  0840   LABBLOO No Growth to date No Growth to date     CBC:   Recent Labs  Lab 10/27/17  0555   WBC 9.37   HGB 12.2   HCT 35.4*        CMP:   Recent Labs  Lab 10/27/17  0555      K 4.0      CO2 22*   *   BUN 52*   CREATININE 1.7*   CALCIUM 10.5   PROT 7.9   ALBUMIN 4.0   BILITOT 0.7   ALKPHOS 80   AST 15   ALT 11   ANIONGAP 14   EGFRNONAA 31*     Cardiac Markers: No results for input(s): CKMB, MYOGLOBIN, BNP, TROPISTAT in the last 48 hours.  Lipase:   Recent Labs  Lab 10/27/17  0555   LIPASE 12     POCT Glucose: No results for input(s): POCTGLUCOSE in the last 48 hours.  Troponin:   Recent Labs  Lab 10/27/17  0555 10/27/17  1140 10/27/17  1736   TROPONINI 0.039* 0.028* 0.019     Urine Culture: No results for input(s): LABURIN in the last 48 hours.  Urine Studies:   Recent Labs  Lab 10/27/17  0630   COLORU Yellow   APPEARANCEUA Cloudy*   PHUR 5.0   SPECGRAV 1.010   PROTEINUA 1+*   GLUCUA Negative   KETONESU Negative   BILIRUBINUA Negative   OCCULTUA Negative   NITRITE Negative   UROBILINOGEN Negative   LEUKOCYTESUR 2+*   RBCUA  2   WBCUA 20*   BACTERIA Many*   SQUAMEPITHEL 15   HYALINECASTS 0       Significant Imaging: I have reviewed all pertinent imaging results/findings within the past 24 hours.    Assessment/Plan:     * Acute cystitis without hematuria    Urine culture pending  IV cipro           Abdominal pain    Clear liquid diet  IVF  PPI   Bentyl  ?gastroparesis vs esophageal dysmotility  GI cosnsult          Acute kidney injury    Likely dehydration and will give IVF   BMp in am           ACS (acute coronary syndrome)              Hyperlipidemia    Resume statin           Hypertension    No acute issues  Resume home medications           Chronic gout    No acute issues  Allopurinol           Type 2 diabetes mellitus without complication, without long-term current use of insulin    Hold oral agents  SSi   Hemoglobin A1c in am             VTE Risk Mitigation         Ordered     enoxaparin injection 30 mg  Daily     Route:  Subcutaneous        10/27/17 1013     Medium Risk of VTE  Once      10/27/17 1013             Melita Blank MD  Department of Hospital Medicine   [facility]

## 2017-10-28 NOTE — SUBJECTIVE & OBJECTIVE
Interval History: pt feels much better and abd pain improved, no vomiting   Review of Systems   Constitutional: Positive for appetite change.   HENT: Negative.    Eyes: Negative.    Respiratory: Negative.    Cardiovascular: Negative.    Gastrointestinal: Positive for abdominal pain, nausea and vomiting.   Endocrine: Negative.    Genitourinary: Negative.    Musculoskeletal: Negative.    Neurological: Negative.    Psychiatric/Behavioral: Negative.      Objective:     Vital Signs (Most Recent):  Temp: 98 °F (36.7 °C) (10/28/17 1135)  Pulse: 66 (10/28/17 1135)  Resp: 18 (10/28/17 1135)  BP: (!) 160/74 (10/28/17 1250)  SpO2: 98 % (10/28/17 1135) Vital Signs (24h Range):  Temp:  [96.3 °F (35.7 °C)-98.8 °F (37.1 °C)] 98 °F (36.7 °C)  Pulse:  [63-89] 66  Resp:  [16-18] 18  SpO2:  [96 %-98 %] 98 %  BP: (142-182)/(70-82) 160/74     Weight: 59.5 kg (131 lb 2.8 oz)  Body mass index is 21.17 kg/m².    Intake/Output Summary (Last 24 hours) at 10/28/17 1637  Last data filed at 10/28/17 1037   Gross per 24 hour   Intake             1420 ml   Output             1100 ml   Net              320 ml      Physical Exam   Constitutional: She is oriented to person, place, and time. She appears well-developed and well-nourished. No distress.   HENT:   Head: Normocephalic and atraumatic.   Mouth/Throat: Oropharynx is clear and moist.   Eyes: Conjunctivae and EOM are normal.   Neck: Normal range of motion. Neck supple. No JVD present.   Cardiovascular: Normal rate, regular rhythm, normal heart sounds and intact distal pulses.    Pulmonary/Chest: Effort normal and breath sounds normal. No respiratory distress.   Abdominal: Soft. Bowel sounds are normal. She exhibits no distension. There is tenderness. There is no rebound and no guarding.   Musculoskeletal: Normal range of motion. She exhibits no edema or deformity.   Neurological: She is alert and oriented to person, place, and time.   Skin: Skin is warm and dry. Capillary refill takes less  than 2 seconds. No erythema.   Psychiatric: She has a normal mood and affect. Her behavior is normal.       Significant Labs:   BMP:   Recent Labs  Lab 10/28/17  0445      *   K 4.9   *   CO2 24   BUN 28*   CREATININE 1.3   CALCIUM 9.0     CBC:   Recent Labs  Lab 10/27/17  0555 10/28/17  0445   WBC 9.37 10.59   HGB 12.2 9.3*   HCT 35.4* 28.0*    187     POCT Glucose:   Recent Labs  Lab 10/27/17  2025 10/28/17  0728 10/28/17  1133   POCTGLUCOSE 147* 106 125*       Significant Imaging: I have reviewed all pertinent imaging results/findings within the past 24 hours.

## 2017-10-28 NOTE — HPI
66 yo female with DM2, HTN, HLP and chronic gout presented with epigastric pain, N/V x 2 days. She came to ED and xray showed retained stool so she took dulcolax and had BM but still has 8/10 pain. She reports pain is sharp and when trying to eat she feels like food is getting stuck in lower chest. She reports decreased appetite and thinks her stool maybe darker than usual. No great historian of information. On admit, she appears to have UTI but denies dysuria, fever and chills. She has normal WBC, elevated Cr 1.7. Abdominal Xray did not show obstruction. She was admitted with UTI and Gi consultation .

## 2017-10-28 NOTE — PROGRESS NOTES
Pt care assumed. Pt aaox4 sitting upright in bed . Pt with telemetry monitor in place with alarms. Pt with iv fluid infusing to lt lower forearm  normal saline at 125 ml/hour site clear. Pt  Has telemetry monitor in place with alarms.pt with no c/o of pain at present . Phlebotomist here drawing blood form pt. Pt informed of md orders for this shift. Safety maintained with bed alarm and personal items within reach.

## 2017-10-28 NOTE — CONSULTS
Patient examined and consult dictated:[id number]  1.Abdominal pain   2. UTI  3. Anemia  4. ARF/CRI  5. DM  6.HTN  7. GERD  Plan: continue antibiotics and protonix. Will do EGD soon. Thanks for the consult.

## 2017-10-29 LAB
ESTIMATED AVG GLUCOSE: 123 MG/DL
HBA1C MFR BLD HPLC: 5.9 %
POCT GLUCOSE: 104 MG/DL (ref 70–110)
POCT GLUCOSE: 117 MG/DL (ref 70–110)
POCT GLUCOSE: 143 MG/DL (ref 70–110)
POCT GLUCOSE: 201 MG/DL (ref 70–110)

## 2017-10-29 PROCEDURE — 63600175 PHARM REV CODE 636 W HCPCS: Performed by: EMERGENCY MEDICINE

## 2017-10-29 PROCEDURE — C9113 INJ PANTOPRAZOLE SODIUM, VIA: HCPCS | Performed by: HOSPITALIST

## 2017-10-29 PROCEDURE — 25000003 PHARM REV CODE 250: Performed by: HOSPITALIST

## 2017-10-29 PROCEDURE — 21400001 HC TELEMETRY ROOM

## 2017-10-29 PROCEDURE — 63600175 PHARM REV CODE 636 W HCPCS: Performed by: HOSPITALIST

## 2017-10-29 RX ADMIN — PANTOPRAZOLE SODIUM 40 MG: 40 INJECTION, POWDER, FOR SOLUTION INTRAVENOUS at 09:10

## 2017-10-29 RX ADMIN — CIPROFLOXACIN 400 MG: 2 INJECTION, SOLUTION INTRAVENOUS at 09:10

## 2017-10-29 RX ADMIN — PRAVASTATIN SODIUM 40 MG: 40 TABLET ORAL at 09:10

## 2017-10-29 RX ADMIN — DOCUSATE SODIUM AND SENNOSIDES 1 TABLET: 8.6; 5 TABLET, FILM COATED ORAL at 09:10

## 2017-10-29 RX ADMIN — DICYCLOMINE HYDROCHLORIDE 10 MG: 10 CAPSULE ORAL at 05:10

## 2017-10-29 RX ADMIN — DICYCLOMINE HYDROCHLORIDE 10 MG: 10 CAPSULE ORAL at 11:10

## 2017-10-29 RX ADMIN — DICYCLOMINE HYDROCHLORIDE 10 MG: 10 CAPSULE ORAL at 06:10

## 2017-10-29 RX ADMIN — POLYETHYLENE GLYCOL 3350 17 G: 17 POWDER, FOR SOLUTION ORAL at 09:10

## 2017-10-29 RX ADMIN — CARVEDILOL 6.25 MG: 6.25 TABLET, FILM COATED ORAL at 09:10

## 2017-10-29 RX ADMIN — ENOXAPARIN SODIUM 30 MG: 100 INJECTION SUBCUTANEOUS at 05:10

## 2017-10-29 RX ADMIN — ALLOPURINOL 300 MG: 100 TABLET ORAL at 09:10

## 2017-10-29 RX ADMIN — DICYCLOMINE HYDROCHLORIDE 10 MG: 10 CAPSULE ORAL at 01:10

## 2017-10-29 NOTE — PROGRESS NOTES
Chief Complaint / Reason for Consult:  Doing well no abdominal pain . Tolerating po well.    ROS: no chest pain or SOB or abdominal pain or signs of GI bleeeding.    Patient Vitals for the past 24 hrs:   BP Temp Temp src Pulse Resp SpO2 Weight   10/29/17 0730 130/65 98 °F (36.7 °C) Oral (!) 54 17 98 % -   10/29/17 0510 138/63 98.9 °F (37.2 °C) Oral 62 18 98 % 65.2 kg (143 lb 11.8 oz)   10/29/17 0000 132/63 98.1 °F (36.7 °C) Oral 69 18 100 % -   10/28/17 2042 (!) 143/68 98.2 °F (36.8 °C) Oral (!) 55 18 99 % -   10/28/17 1930 - - - 60 - - -   10/28/17 1642 (!) 141/69 98.2 °F (36.8 °C) Oral (!) 48 20 97 % -   10/28/17 1558 - - - - - - 59.5 kg (131 lb 2.8 oz)   10/28/17 1250 (!) 160/74 - - - - - -   10/28/17 1135 (!) 182/82 98 °F (36.7 °C) Oral 66 18 98 % -       Physical Exam:  Gen - Well developed, well nourished, no apparent distress  HEENT - Anicteric  CV - S1, S2, no murmurs/rubs  Lungs - CTA-B, normal excursion  Abd - Soft, NT, ND, normal BS's, no HSM.  Ext - No c/c/e  Neuro - No asterixis    Labs:  No results for input(s): WBC, RBC, HGB, HCT, PLT, MCV, MCH, MCHC in the last 24 hours.  No results for input(s): GLUCOSE, CALCIUM, PROT, NA, K, CO2, CL, BUN, CREATININE, ALKPHOS, ALT, AST, BILITOT in the last 24 hours.    Invalid input(s):  ALBUMIN  No results for input(s): INR, APTT in the last 24 hours.    Invalid input(s): PT      Assessment:  This patient is a 67 y.o. female with:   1. UTI  2. Anemia  3. Abdominal pain  4.GERD    Recommendations:  1.  EGD in AM.

## 2017-10-29 NOTE — SUBJECTIVE & OBJECTIVE
Interval History: pt reports tolerating diet and abdominal pain improved    Review of Systems   Constitutional: Positive for appetite change.   HENT: Negative.    Eyes: Negative.    Respiratory: Negative.    Cardiovascular: Negative.    Gastrointestinal: Positive for abdominal pain, nausea and vomiting.   Endocrine: Negative.    Genitourinary: Negative.    Musculoskeletal: Negative.    Neurological: Negative.    Psychiatric/Behavioral: Negative.      Objective:     Vital Signs (Most Recent):  Temp: 98.3 °F (36.8 °C) (10/29/17 1126)  Pulse: 64 (10/29/17 1126)  Resp: 18 (10/29/17 1126)  BP: 124/62 (10/29/17 1126)  SpO2: 98 % (10/29/17 1126) Vital Signs (24h Range):  Temp:  [98 °F (36.7 °C)-98.9 °F (37.2 °C)] 98.3 °F (36.8 °C)  Pulse:  [48-69] 64  Resp:  [17-20] 18  SpO2:  [97 %-100 %] 98 %  BP: (124-143)/(62-69) 124/62     Weight: 65.2 kg (143 lb 11.8 oz)  Body mass index is 23.2 kg/m².    Intake/Output Summary (Last 24 hours) at 10/29/17 1331  Last data filed at 10/29/17 0945   Gross per 24 hour   Intake             2550 ml   Output             1450 ml   Net             1100 ml      Physical Exam   Constitutional: She is oriented to person, place, and time. She appears well-developed and well-nourished. No distress.   HENT:   Head: Normocephalic and atraumatic.   Mouth/Throat: Oropharynx is clear and moist.   Eyes: Conjunctivae and EOM are normal.   Neck: Normal range of motion. Neck supple. No JVD present.   Cardiovascular: Normal rate, regular rhythm, normal heart sounds and intact distal pulses.    Pulmonary/Chest: Effort normal and breath sounds normal. No respiratory distress.   Abdominal: Soft. Bowel sounds are normal. She exhibits no distension. There is tenderness. There is no rebound and no guarding.   Musculoskeletal: Normal range of motion. She exhibits no edema or deformity.   Neurological: She is alert and oriented to person, place, and time.   Skin: Skin is warm and dry. Capillary refill takes less than  2 seconds. No erythema.   Psychiatric: She has a normal mood and affect. Her behavior is normal.       Significant Labs:   BMP:   Recent Labs  Lab 10/28/17  0445      *   K 4.9   *   CO2 24   BUN 28*   CREATININE 1.3   CALCIUM 9.0     CBC:   Recent Labs  Lab 10/28/17  0445   WBC 10.59   HGB 9.3*   HCT 28.0*        POCT Glucose:   Recent Labs  Lab 10/28/17  2045 10/29/17  0728 10/29/17  1122   POCTGLUCOSE 105 117* 201*       Significant Imaging: I have reviewed all pertinent imaging results/findings within the past 24 hours.

## 2017-10-29 NOTE — PROGRESS NOTES
Pt care assumed, pt aaox4 sitting upright in bed watching tv. Pt with no c/o of pain or any discomfort. Pt stated she feels better today. Telemetry monitor in place with alarms. Saline lock to lt fore arm is intact and site is clear. Pt personal items are within reach bed alarm set for pt safety.pt informed of POC for this shift.

## 2017-10-29 NOTE — NURSING
Pt resting in bed quietly. No complaints of pain. Fall and safety precautions maintained. Bed locked in lowest position with side rails x 2. Call bell and personal items within reach. Shift report given to night rn. Chart check completed.

## 2017-10-29 NOTE — CONSULTS
CONSULTING PHYSICIAN:  Dr. Melita Blank.    INDICATIONS CONSULTATION:  Abdominal pain.    CLINICAL SUMMARY:  This elderly age 67-year-old female with a history of   hypertension, diabetes, who was doing well until a week before she came into the   Emergency Room, she has been having this abdominal pain for the last week to 10   days.  The patient was seen in the Urgent Care Clinic, told that she has been   constipated and had a large amount of solid stool on the right side and gave her   some laxatives.  The patient had a good bowel movement, but continued to have   abdominal pain.  No blood was seen in the stool.  The patient also denies of   having any melena; however, she does complain of having some problem with   swallowing, especially the solid foods getting caught at the distal esophagus.    She denies having any fever or chills at home.  The patient is admitted to the   hospital for evaluation of abdominal pain.     PAST MEDICAL HISTORY:  Pertinent for:  1.  Hypertension.  2.  Diabetes mellitus.  3.  Denies of having any coronary artery disease.    PAST SURGICAL HISTORY:  Status post thyroid resection in the past.  The patient   had a breast biopsy.  The patient also had hysterectomy in the past.    MEDICATIONS AT HOME:  Include, she takes albuterol, allopurinol 300 mg p.o.   every day.  She is also on carvedilol 6.25 mg p.o. b.i.d.  She is on dicyclomine   10 mg p.o. q.i.d.  She is also on pantoprazole 40 mg at the present time, IV   every day.  She is on pravastatin 40 mg p.o. every day.     ALLERGIES:   She is allergic to codeine, colchicine, doxycycline and also   Indocin, penicillin, tramadol and Vicodin.    SOCIAL HISTORY:  She is , has 1 child of 45-year-old, does not smoke,   but social alcohol usually 1 or 2 beers once in a while.    FAMILY HISTORY:  Pertinent for mother  of lung cancer.  Father had acute MI   with cardiac disease and  from the same, also had a history of  hypertension   in the family.    REVIEW OF SYSTEMS:  She denies of having any headache, any problem with eyes,   nose and throat.  Denies having any chest pain, shortness of breath, cough or   congestion.  Denies of having any problem with her extremities.    PHYSICAL EXAMINATION:  GENERAL:  This is a pleasant elderly female under no apparent distress at the   present time.   VITAL SIGNS:  She is afebrile, temperature of 96.3, pulse of 16,   blood pressure 142/70, pulse of 63.  HEENT:  She has no icterus.  No JVD.  No bruits.  HEART:  Regular rate and rhythm.  LUNGS:  Clear.  ABDOMEN:  Soft, positive bowel sounds.  Some tenderness.  No rebound or   guarding.  EXTREMITIES:  No edema was noted.    LABORATORY DATA:   She had H and H of 12.2 and 35.4 at the time of admission,   repeat is 9.3 and 28.  She has normal platelet count.  Her BUN and creatinine at   the time of admission were 57 and 1.7, presently at 28 and 1.3.  The patient   had glucose of 150 at the time of admission and presently 107.  Liver function   tests are within normal limits.  Her amylase, lipase were within normal limits.    Troponin was slightly elevated, now trended back to normal.  Urinalysis showed   too many bacteria with 20 wbc's per high power field.  She also had a KUB done.    There is no free air or obstruction noted.  Chest x-ray is completely normal.    IMPRESSION:  1.  Acute abdominal pain, etiology not clear.  2.  Gastroesophageal reflux.  3.  Anemia.  4.  Acute renal failure plus or minus chronic renal insufficiency.  5.  Anemia.  6.  Hypertension.  7.  Diabetes mellitus.    PLAN:  The patient will be continued on Cipro for the UTI.  We will continue the   Protonix for right now.  We will do an EGD either tomorrow or day after before   the patient is discharged.     Thank you Dr. Blank for giving us the opportunity involving Ms. Ninfa Philip's care.      STR/IN  dd: 10/28/2017 13:13:33 (CDT)  td: 10/28/2017 20:02:04 (CDT)   Doc ID   #[id number]  Job ID #[id number]    CC: Melita Blank M.D.

## 2017-10-29 NOTE — NURSING
No acute distress. Denies pain or discomfort. AAOx4. SR x 2, bed locked in lowest position with call bell in reach.

## 2017-10-29 NOTE — PLAN OF CARE
10/29/17 1026   Discharge Assessment   Assessment Type Discharge Planning Assessment   Confirmed/corrected address and phone number on facesheet? Yes   Assessment information obtained from? Patient   Prior to hospitilization cognitive status: Alert/Oriented   Prior to hospitalization functional status: Independent   Current cognitive status: Alert/Oriented   Current Functional Status: Independent   Lives With alone   Able to Return to Prior Arrangements yes   Is patient able to care for self after discharge? Yes   Who are your caregiver(s) and their phone number(s)? Sister Kathy Alvarez [phone]   Patient's perception of discharge disposition home or selfcare   Readmission Within The Last 30 Days no previous admission in last 30 days   Patient currently being followed by outpatient case management? No   Patient currently receives any other outside agency services? No   Equipment Currently Used at Home none   Do you have any problems affording any of your prescribed medications? No   Is the patient taking medications as prescribed? yes   Does the patient have transportation home? Yes   Transportation Available family or friend will provide   Does the patient receive services at the Coumadin Clinic? No   Discharge Plan A Home   Discharge Plan B Home   Patient/Family In Agreement With Plan yes        10/29/17 1026   Discharge Assessment   Assessment Type Discharge Planning Assessment   Confirmed/corrected address and phone number on facesheet? Yes   Assessment information obtained from? Patient   Prior to hospitilization cognitive status: Alert/Oriented   Prior to hospitalization functional status: Independent   Current cognitive status: Alert/Oriented   Current Functional Status: Independent   Lives With alone   Able to Return to Prior Arrangements yes   Is patient able to care for self after discharge? Yes   Who are your caregiver(s) and their phone number(s)? Sister Kathy Alvarez [phone]   Patient's  perception of discharge disposition home or selfcare   Readmission Within The Last 30 Days no previous admission in last 30 days   Patient currently being followed by outpatient case management? No   Patient currently receives any other outside agency services? No   Equipment Currently Used at Home none   Do you have any problems affording any of your prescribed medications? No   Is the patient taking medications as prescribed? yes   Does the patient have transportation home? Yes   Transportation Available family or friend will provide   Does the patient receive services at the Coumadin Clinic? No   Discharge Plan A Home   Discharge Plan B Home   Patient/Family In Agreement With Plan yes       PCP: Jt Sharif MD  Patient has appointments coming up, but will need to be rescheduled.     Nephrology Appointment for tomorrow 10/30. (Does not remember physician name; PH: [phone])  PCP: Next Week 11/7 for blood work and 11/09 with MD (but has to see nephrologist first).

## 2017-10-29 NOTE — PLAN OF CARE
Problem: Patient Care Overview  Goal: Plan of Care Review  Outcome: Ongoing (interventions implemented as appropriate)   10/29/17 1420   Coping/Psychosocial   Plan Of Care Reviewed With patient     Goal: Individualization & Mutuality  Outcome: Ongoing (interventions implemented as appropriate)   10/27/17 1030   Mutuality/Individual Preferences   What Anxieties, Fears, Concerns, or Questions Do You Have About Your Care? None   What Information Would Help Us Give You More Personalized Care? NA     Goal: Discharge Needs Assessment   10/29/17 1026   Activity/Self Care ROS   Equipment Currently Used at Home none   Living Environment   Transportation Available family or friend will provide   Social Work Plan   Patient's perception of discharge disposition home or selfcare   Patient/Family In Agreement With Plan yes   Living Environment   Able to Return to Prior Arrangements yes   Discharge Needs Assessment   Readmission Within The Last 30 Days no previous admission in last 30 days   OTHER   Is patient able to care for self after discharge? Yes   Who are your caregiver(s) and their phone number(s)? Sister Kathy Alvarez [phone]     Goal: Interdisciplinary Rounds/Family Conf   10/29/17 1420   Interdisciplinary Rounds/Family Conf   Participants nursing;patient;physician;family

## 2017-10-29 NOTE — PROGRESS NOTES
[facility] Hospital Medicine  Progress Note    Patient Name: Nifna Philip  MRN: [id number]  Patient Class: IP- Inpatient   Admission Date: 10/27/2017  Length of Stay: 2 days  Attending Physician: Melita Blank MD  Primary Care Provider: Jt Sharif MD        Subjective:     Principal Problem:Acute cystitis without hematuria    HPI:  68 yo female with DM2, HTN, HLP and chronic gout presented with epigastric pain, N/V x 2 days. She came to ED and xray showed retained stool so she took dulcolax and had BM but still has 8/10 pain. She reports pain is sharp and when trying to eat she feels like food is getting stuck in lower chest. She reports decreased appetite and thinks her stool maybe darker than usual. No great historian of information. On admit, she appears to have UTI but denies dysuria, fever and chills. She has normal WBC, elevated Cr 1.7. Abdominal Xray did not show obstruction. She was admitted with UTI and Gi consultation .     Hospital Course:  Pt admitted with UTI and abdominal pain. Improved with bentyl. GI to do EGD on Monday 10/30.  Diet advanced as tolerated. Plan for EGD in am. Cipro for uti though culture is negative, UA was strongly positive.     Interval History: pt reports tolerating diet and abdominal pain improved    Review of Systems   Constitutional: Positive for appetite change.   HENT: Negative.    Eyes: Negative.    Respiratory: Negative.    Cardiovascular: Negative.    Gastrointestinal: Positive for abdominal pain, nausea and vomiting.   Endocrine: Negative.    Genitourinary: Negative.    Musculoskeletal: Negative.    Neurological: Negative.    Psychiatric/Behavioral: Negative.      Objective:     Vital Signs (Most Recent):  Temp: 98.3 °F (36.8 °C) (10/29/17 1126)  Pulse: 64 (10/29/17 1126)  Resp: 18 (10/29/17 1126)  BP: 124/62 (10/29/17 1126)  SpO2: 98 % (10/29/17 1126) Vital Signs (24h Range):  Temp:  [98 °F (36.7 °C)-98.9 °F (37.2 °C)] 98.3 °F (36.8  °C)  Pulse:  [48-69] 64  Resp:  [17-20] 18  SpO2:  [97 %-100 %] 98 %  BP: (124-143)/(62-69) 124/62     Weight: 65.2 kg (143 lb 11.8 oz)  Body mass index is 23.2 kg/m².    Intake/Output Summary (Last 24 hours) at 10/29/17 1331  Last data filed at 10/29/17 0945   Gross per 24 hour   Intake             2550 ml   Output             1450 ml   Net             1100 ml      Physical Exam   Constitutional: She is oriented to person, place, and time. She appears well-developed and well-nourished. No distress.   HENT:   Head: Normocephalic and atraumatic.   Mouth/Throat: Oropharynx is clear and moist.   Eyes: Conjunctivae and EOM are normal.   Neck: Normal range of motion. Neck supple. No JVD present.   Cardiovascular: Normal rate, regular rhythm, normal heart sounds and intact distal pulses.    Pulmonary/Chest: Effort normal and breath sounds normal. No respiratory distress.   Abdominal: Soft. Bowel sounds are normal. She exhibits no distension. There is tenderness. There is no rebound and no guarding.   Musculoskeletal: Normal range of motion. She exhibits no edema or deformity.   Neurological: She is alert and oriented to person, place, and time.   Skin: Skin is warm and dry. Capillary refill takes less than 2 seconds. No erythema.   Psychiatric: She has a normal mood and affect. Her behavior is normal.       Significant Labs:   BMP:   Recent Labs  Lab 10/28/17  0445      *   K 4.9   *   CO2 24   BUN 28*   CREATININE 1.3   CALCIUM 9.0     CBC:   Recent Labs  Lab 10/28/17  0445   WBC 10.59   HGB 9.3*   HCT 28.0*        POCT Glucose:   Recent Labs  Lab 10/28/17  2045 10/29/17  0728 10/29/17  1122   POCTGLUCOSE 105 117* 201*       Significant Imaging: I have reviewed all pertinent imaging results/findings within the past 24 hours.    Assessment/Plan:      * Acute cystitis without hematuria    Urine culture pending  IV cipro           Abdominal pain    Clear liquid diet advance as tolerated   IVF  PPI    Bentyl  ?gastroparesis vs esophageal dysmotility  GI cosnsult - egd 10/30          Acute kidney injury    Likely dehydration and will give IVF   BMp in am           ACS (acute coronary syndrome)              Hyperlipidemia    Resume statin           Hypertension    No acute issues  Resume home medications           Chronic gout    No acute issues  Allopurinol           Type 2 diabetes mellitus without complication, without long-term current use of insulin    Hold oral agents  SSi   Hemoglobin A1c in am             VTE Risk Mitigation         Ordered     enoxaparin injection 30 mg  Daily     Route:  Subcutaneous        10/27/17 1013     Medium Risk of VTE  Once      10/27/17 1013              Melita Blank MD  Department of Hospital Medicine   [facility]

## 2017-10-29 NOTE — PLAN OF CARE
Problem: Nutrition, Imbalanced: Inadequate Oral Intake (Adult)  Intervention: Explore Physical/Psychosocial/Treatment-related Factors Impacting Oral Intake   10/29/17 0417   Coping/Psychosocial Interventions   Supportive Measures active listening utilized;relaxation techniques promoted     Intervention: Promote/Optimize Nutrition   10/29/17 0417   Nutrition Interventions   Oral Nutrition Promotion calorie dense foods provided         Comments: Pt started on a soft diet on yesterday and she has had no nausea or vomiting.she tolerated her meals well.

## 2017-10-30 ENCOUNTER — ANESTHESIA EVENT (OUTPATIENT)
Dept: ENDOSCOPY | Facility: HOSPITAL | Age: 67
DRG: 690 | End: 2017-10-30
Payer: MEDICARE

## 2017-10-30 ENCOUNTER — ANESTHESIA (OUTPATIENT)
Dept: ENDOSCOPY | Facility: HOSPITAL | Age: 67
DRG: 690 | End: 2017-10-30
Payer: MEDICARE

## 2017-10-30 VITALS
HEIGHT: 66 IN | TEMPERATURE: 98 F | WEIGHT: 144.81 LBS | HEART RATE: 62 BPM | DIASTOLIC BLOOD PRESSURE: 58 MMHG | OXYGEN SATURATION: 98 % | BODY MASS INDEX: 23.27 KG/M2 | RESPIRATION RATE: 18 BRPM | SYSTOLIC BLOOD PRESSURE: 126 MMHG

## 2017-10-30 PROBLEM — N30.00 ACUTE CYSTITIS WITHOUT HEMATURIA: Status: RESOLVED | Noted: 2017-10-27 | Resolved: 2017-10-30

## 2017-10-30 PROBLEM — R10.9 ABDOMINAL PAIN: Status: RESOLVED | Noted: 2017-10-27 | Resolved: 2017-10-30

## 2017-10-30 PROBLEM — E86.0 DEHYDRATION: Status: RESOLVED | Noted: 2017-10-28 | Resolved: 2017-10-30

## 2017-10-30 PROBLEM — N17.9 ACUTE KIDNEY INJURY: Status: RESOLVED | Noted: 2017-10-27 | Resolved: 2017-10-30

## 2017-10-30 LAB
BACTERIA UR CULT: NORMAL
POCT GLUCOSE: 95 MG/DL (ref 70–110)

## 2017-10-30 PROCEDURE — 37000009 HC ANESTHESIA EA ADD 15 MINS: Performed by: INTERNAL MEDICINE

## 2017-10-30 PROCEDURE — 88305 TISSUE EXAM BY PATHOLOGIST: CPT | Mod: 26,,,

## 2017-10-30 PROCEDURE — D9220A PRA ANESTHESIA: Mod: ANES,,, | Performed by: ANESTHESIOLOGY

## 2017-10-30 PROCEDURE — 25000003 PHARM REV CODE 250: Performed by: NURSE ANESTHETIST, CERTIFIED REGISTERED

## 2017-10-30 PROCEDURE — 88342 IMHCHEM/IMCYTCHM 1ST ANTB: CPT | Mod: 26,,,

## 2017-10-30 PROCEDURE — D9220A PRA ANESTHESIA: Mod: CRNA,,, | Performed by: NURSE ANESTHETIST, CERTIFIED REGISTERED

## 2017-10-30 PROCEDURE — 43239 EGD BIOPSY SINGLE/MULTIPLE: CPT | Performed by: INTERNAL MEDICINE

## 2017-10-30 PROCEDURE — 37000008 HC ANESTHESIA 1ST 15 MINUTES: Performed by: INTERNAL MEDICINE

## 2017-10-30 PROCEDURE — 63600175 PHARM REV CODE 636 W HCPCS: Performed by: NURSE ANESTHETIST, CERTIFIED REGISTERED

## 2017-10-30 PROCEDURE — 0DB68ZX EXCISION OF STOMACH, VIA NATURAL OR ARTIFICIAL OPENING ENDOSCOPIC, DIAGNOSTIC: ICD-10-PCS | Performed by: INTERNAL MEDICINE

## 2017-10-30 PROCEDURE — 25000003 PHARM REV CODE 250: Performed by: INTERNAL MEDICINE

## 2017-10-30 PROCEDURE — 0D748ZZ DILATION OF ESOPHAGOGASTRIC JUNCTION, VIA NATURAL OR ARTIFICIAL OPENING ENDOSCOPIC: ICD-10-PCS | Performed by: INTERNAL MEDICINE

## 2017-10-30 PROCEDURE — 43248 EGD GUIDE WIRE INSERTION: CPT | Performed by: INTERNAL MEDICINE

## 2017-10-30 PROCEDURE — 25000003 PHARM REV CODE 250: Performed by: HOSPITALIST

## 2017-10-30 PROCEDURE — 27201012 HC FORCEPS, HOT/COLD, DISP: Performed by: INTERNAL MEDICINE

## 2017-10-30 PROCEDURE — C9113 INJ PANTOPRAZOLE SODIUM, VIA: HCPCS | Performed by: HOSPITALIST

## 2017-10-30 PROCEDURE — 88305 TISSUE EXAM BY PATHOLOGIST: CPT

## 2017-10-30 PROCEDURE — 63600175 PHARM REV CODE 636 W HCPCS: Performed by: HOSPITALIST

## 2017-10-30 RX ORDER — SODIUM CHLORIDE 9 MG/ML
INJECTION, SOLUTION INTRAVENOUS ONCE
Status: COMPLETED | OUTPATIENT
Start: 2017-10-30 | End: 2017-10-30

## 2017-10-30 RX ORDER — PROPOFOL 10 MG/ML
VIAL (ML) INTRAVENOUS
Status: DISCONTINUED
Start: 2017-10-30 | End: 2017-10-30 | Stop reason: HOSPADM

## 2017-10-30 RX ORDER — LIDOCAINE HYDROCHLORIDE 20 MG/ML
INJECTION, SOLUTION EPIDURAL; INFILTRATION; INTRACAUDAL; PERINEURAL
Status: DISCONTINUED
Start: 2017-10-30 | End: 2017-10-30 | Stop reason: HOSPADM

## 2017-10-30 RX ORDER — DICYCLOMINE HYDROCHLORIDE 10 MG/1
10 CAPSULE ORAL 4 TIMES DAILY PRN
Qty: 120 CAPSULE | Refills: 3 | Status: SHIPPED | OUTPATIENT
Start: 2017-10-30 | End: 2017-11-29

## 2017-10-30 RX ORDER — LIDOCAINE HCL/PF 100 MG/5ML
SYRINGE (ML) INTRAVENOUS
Status: DISCONTINUED | OUTPATIENT
Start: 2017-10-30 | End: 2017-10-30

## 2017-10-30 RX ORDER — PROPOFOL 10 MG/ML
VIAL (ML) INTRAVENOUS
Status: COMPLETED
Start: 2017-10-30 | End: 2017-10-30

## 2017-10-30 RX ORDER — SODIUM CHLORIDE 9 MG/ML
INJECTION, SOLUTION INTRAVENOUS CONTINUOUS PRN
Status: DISCONTINUED | OUTPATIENT
Start: 2017-10-30 | End: 2017-10-30

## 2017-10-30 RX ORDER — PROPOFOL 10 MG/ML
VIAL (ML) INTRAVENOUS
Status: DISCONTINUED | OUTPATIENT
Start: 2017-10-30 | End: 2017-10-30

## 2017-10-30 RX ORDER — PANTOPRAZOLE SODIUM 40 MG/1
40 TABLET, DELAYED RELEASE ORAL 2 TIMES DAILY
Qty: 60 TABLET | Refills: 3 | Status: SHIPPED | OUTPATIENT
Start: 2017-10-30 | End: 2019-06-08

## 2017-10-30 RX ADMIN — SODIUM CHLORIDE: 0.9 INJECTION, SOLUTION INTRAVENOUS at 09:10

## 2017-10-30 RX ADMIN — CIPROFLOXACIN 400 MG: 2 INJECTION, SOLUTION INTRAVENOUS at 12:10

## 2017-10-30 RX ADMIN — PROPOFOL 50 MG: 10 INJECTION, EMULSION INTRAVENOUS at 10:10

## 2017-10-30 RX ADMIN — PROPOFOL 70 MG: 10 INJECTION, EMULSION INTRAVENOUS at 10:10

## 2017-10-30 RX ADMIN — LIDOCAINE HYDROCHLORIDE 100 MG: 20 INJECTION, SOLUTION INTRAVENOUS at 10:10

## 2017-10-30 RX ADMIN — PANTOPRAZOLE SODIUM 40 MG: 40 INJECTION, POWDER, FOR SOLUTION INTRAVENOUS at 12:10

## 2017-10-30 RX ADMIN — DICYCLOMINE HYDROCHLORIDE 10 MG: 10 CAPSULE ORAL at 12:10

## 2017-10-30 RX ADMIN — CARVEDILOL 6.25 MG: 6.25 TABLET, FILM COATED ORAL at 12:10

## 2017-10-30 RX ADMIN — ALLOPURINOL 300 MG: 100 TABLET ORAL at 12:10

## 2017-10-30 RX ADMIN — PRAVASTATIN SODIUM 40 MG: 40 TABLET ORAL at 12:10

## 2017-10-30 RX ADMIN — DICYCLOMINE HYDROCHLORIDE 10 MG: 10 CAPSULE ORAL at 05:10

## 2017-10-30 RX ADMIN — PROPOFOL 60 MG: 10 INJECTION, EMULSION INTRAVENOUS at 10:10

## 2017-10-30 NOTE — TRANSFER OF CARE
"Anesthesia Transfer of Care Note    Patient: Ninfa Philip    Procedure(s) Performed: Procedure(s) (LRB):  ESOPHAGOGASTRODUODENOSCOPY (EGD) (Left)    Patient location: GI    Anesthesia Type: general    Transport from OR: Transported from OR on room air with adequate spontaneous ventilation    Post pain: adequate analgesia    Post assessment: tolerated procedure well and no apparent anesthetic complications    Post vital signs: stable    Level of consciousness: sedated    Nausea/Vomiting: no nausea/vomiting    Complications: none    Transfer of care protocol was followed      Last vitals:   Visit Vitals  /67 (BP Location: Left arm, Patient Position: Lying)   Pulse 74   Temp 36.9 °C (98.4 °F) (Oral)   Resp 17   Ht 5' 5.98" (1.676 m)   Wt 65.7 kg (144 lb 13.5 oz)   SpO2 96%   Breastfeeding? No   BMI 23.39 kg/m²     "

## 2017-10-30 NOTE — PROGRESS NOTES
Preparation for discharge: Patient is awake, alert and fully oriented sitting up in a bedside chair; Patient is aware that she is being discharged from the hospital and in agreement with same; Removed patient's right IV and removed telemetry monitor; Patient denies pain/discomfort at this time; V/S stable at 126/58, 62, 18, 98.3F 18; Reviewed discharge medications and appointments. Patient in agreement with same. Will contact hospital transport for w/c to parking garage as soon as transportation arrives. Will f/u until departure from hospital;

## 2017-10-30 NOTE — DISCHARGE SUMMARY
[facility]  Discharge Summary      Admit Date: 10/27/2017    Discharge Date and Time:  10/30/2017 10:19 AM    Attending Physician: Melita Blank MD     Reason for Admission: Abdominal Pain, Dysphagia    Procedures Performed: Procedure(s) (LRB):  ESOPHAGOGASTRODUODENOSCOPY (EGD) (Left)    Hospital Course (synopsis of major diagnoses, care, treatment, and services provided during the course of the hospital stay): Ongoing     Consults: GI    Significant Diagnostic Studies: EGD and colonoscopy    Final Diagnoses:    Principal Problem: Acute cystitis without hematuria   Secondary Diagnoses: EGD and colonoscopy    Discharged Condition: good    Disposition: Admitted as an Inpatient    Follow Up/Patient Instructions: Follow-up with referring physician             Resume previous diet and activity.    Medications:  Transfer Medications (for Discharge Readmit only):   Current Facility-Administered Medications   Medication Dose Route Frequency Provider Last Rate Last Dose    acetaminophen tablet 650 mg  650 mg Oral Q6H PRN Melita Blank MD        allopurinol tablet 300 mg  300 mg Oral Daily Melita Blank MD   300 mg at 10/29/17 0947    carvedilol tablet 6.25 mg  6.25 mg Oral BID Melita Blank MD   6.25 mg at 10/29/17 2100    ciprofloxacin (CIPRO)400mg/200ml D5W IVPB 400 mg  400 mg Intravenous Q24H Melita Blank  mL/hr at 10/29/17 0945 400 mg at 10/29/17 0945    dextrose 50% injection 12.5 g  12.5 g Intravenous PRN Melita Blank MD        dextrose 50% injection 25 g  25 g Intravenous PRN Melita Blank MD        dicyclomine capsule 10 mg  10 mg Oral QID Melita Blank MD   10 mg at 10/30/17 0535    enoxaparin injection 30 mg  30 mg Subcutaneous Daily Chevy Farias MD   30 mg at 10/29/17 1726    glucagon (human recombinant) injection 1 mg  1 mg Intramuscular PRN Melita Blank MD        glucose chewable tablet 16 g  16 g Oral PRN Melita Blank MD         glucose chewable tablet 24 g  24 g Oral PRN Melita Blank MD        insulin aspart pen 0-5 Units  0-5 Units Subcutaneous QID (AC + HS) PRN Melita Blank MD        lidocaine  20 mg/mL (2%) 20 mg/mL (2 %) injection             ondansetron injection 4 mg  4 mg Intravenous Q6H PRN Melita Blank MD   4 mg at 10/27/17 1616    pantoprazole injection 40 mg  40 mg Intravenous Daily Melita Blank MD   40 mg at 10/29/17 0946    polyethylene glycol packet 17 g  17 g Oral Daily Melita Blank MD   17 g at 10/29/17 0947    pravastatin tablet 40 mg  40 mg Oral Daily Melita Blank MD   40 mg at 10/29/17 0947    ramelteon tablet 8 mg  8 mg Oral Nightly PRN Melita Blank MD        senna-docusate 8.6-50 mg per tablet 1 tablet  1 tablet Oral Daily Melita Blank MD   1 tablet at 10/29/17 0947     Facility-Administered Medications Ordered in Other Encounters   Medication Dose Route Frequency Provider Last Rate Last Dose    0.9%  NaCl infusion    Continuous PRN Pam Mueller CRNA        lidocaine (cardiac) injection    PRN Pam Mueller CRNA   100 mg at 10/30/17 1006    propofol (DIPRIVAN) 10 mg/mL infusion    PRN Pam Mueller CRNA   50 mg at 10/30/17 1015     No discharge procedures on file.

## 2017-10-30 NOTE — PLAN OF CARE
Problem: Nutrition, Imbalanced: Inadequate Oral Intake (Adult)  Intervention: Explore Physical/Psychosocial/Treatment-related Factors Impacting Oral Intake   10/29/17 0417   Coping/Psychosocial Interventions   Supportive Measures active listening utilized;relaxation techniques promoted     Intervention: Promote/Optimize Nutrition   10/29/17 0417   Nutrition Interventions   Oral Nutrition Promotion calorie dense foods provided         Comments: PT admitted with abd pain and she is scheduled for an EGD this am . Pt has  Not had any nausea or vomiting post meals.

## 2017-10-30 NOTE — ANESTHESIA PREPROCEDURE EVALUATION
10/30/2017  Ninfa Philip is a 67 y.o., female.    Anesthesia Evaluation    I have reviewed the Patient Summary Reports.    I have reviewed the Nursing Notes.   I have reviewed the Medications.     Review of Systems  Anesthesia Hx:  No problems with previous Anesthesia Denies Hx of Anesthetic complications  Neg history of prior surgery. Denies Family Hx of Anesthesia complications.   Denies Personal Hx of Anesthesia complications.   Social:  Non-Smoker, No Alcohol Use    Hematology/Oncology:  Hematology Normal   Oncology Normal     EENT/Dental:EENT/Dental Normal   Cardiovascular:   Exercise tolerance: good Hypertension Past MI     Pulmonary:  Pulmonary Normal    Renal/:   Chronic Renal Disease    Hepatic/GI:  Hepatic/GI Normal    Musculoskeletal:  Musculoskeletal Normal    Neurological:  Neurology Normal    Endocrine:   Diabetes, type 2    Dermatological:  Skin Normal    Psych:  Psychiatric Normal           Physical Exam  General:  Well nourished    Airway/Jaw/Neck:  Airway Findings: Mouth Opening: Normal General Airway Assessment: Adult  Mallampati: II  TM Distance: Normal, at least 6 cm         Dental:  DENTAL FINDINGS: Normal   Chest/Lungs:  Chest/Lungs Clear    Heart/Vascular:  Heart Findings: Normal Heart murmur: negative       Mental Status:  Mental Status Findings:  Cooperative, Alert and Oriented         Anesthesia Plan  Type of Anesthesia, risks & benefits discussed:  Anesthesia Type:  general  Patient's Preference:   Intra-op Monitoring Plan:   Intra-op Monitoring Plan Comments:   Post Op Pain Control Plan:   Post Op Pain Control Plan Comments:   Induction:   IV  Beta Blocker:  Patient is not currently on a Beta-Blocker (No further documentation required).       Informed Consent: Patient understands risks and agrees with Anesthesia plan.  Questions answered. Anesthesia consent signed with  patient.  ASA Score: 3     Day of Surgery Review of History & Physical:            Ready For Surgery From Anesthesia Perspective.

## 2017-10-30 NOTE — NURSING
Pt ambulated to bathroom with assist. No complaints of pain, but describes abdominal discomfort as a need to have BM . Fall and safety precautions maintained. Bed locked in lowest position with side rails x 2. Call bell and personal items within reach. Shift report given to night rn. Chart check completed.

## 2017-10-30 NOTE — PROGRESS NOTES
Follow-up Information     Jt Sharif MD On 11/9/2017.    Specialty:  Family Medicine  Why:  Appointment scheduled for Thursday November 9th thang 2:45pm. Please keep scheduled follow up appointment   Contact information:  [address] [address] [address]  [phone]             Dickson Nelson MD In 1 month.    Specialty:  Gastroenterology  Why:  Call to schedule follow up appointment   Contact information:  [address] [place] [address]  [address]  [facility]  [place] [address]  [phone]             [facility] On 11/7/2017.    Specialty:  Lab  Why:  Appointment scheduled for Tuesday November 7th at 9am.   Contact information:  [address] [address] [address]  [phone]               Thank you for choosing [facility] for your care. Within 48-72 hours after leaving the hospital you will receive a call from [facility] Nurses following up to see how you are doing. The team will ask you a few questions and the call will last approximately 20 minutes.     Please answer any calls you may receive from [facility] we want to continue to support you as you manage your healthcare needs. [facility] is happy to have the opportunity to serve you.     [facility] On [facility] Care Line - 24/7 Assistance  Registered [facility] nurses can provide appointment booking, health education, clinical advisement, and other advisory services.   Call for this free service at [phone].              Sincerely,   Your [facility] Team,   Mia Adan RN/[place]  [phone]

## 2017-10-30 NOTE — PROGRESS NOTES
"Pt care assumed, pt aaox4 sitting up in chair at bedside. Pt with no c/o of pain or any discomfort pt has telemetry monitor in place with alarms .saline lock to lt forearm site clear. Pt personal items are within reach, call bell for nurse is within pt"S reach.pt informed of md test and plan of care for this shift.  "

## 2017-10-30 NOTE — PROGRESS NOTES
Note that patient awake, alert and fully oriented; Off floor for EGD procedure; Instructed to hold medication until after procedure; Will continue to f/u;

## 2017-10-30 NOTE — PLAN OF CARE
10/30/17 1238   Final Note   Assessment Type Final Discharge Note   Discharge Disposition Home   Hospital Follow Up  Appt(s) scheduled? Yes   Discharge plans and expectations educations in teach back method with documentation complete? Yes   Right Care Referral Info   Post Acute Recommendation No Care     Follow-up Information     Jt Sharif MD On 11/9/2017.    Specialty:  Family Medicine  Why:  Appointment scheduled for Thursday November 9th thang 2:45pm. Please keep scheduled follow up appointment   Contact information:  [address] [address] [address]  [phone]             Dickson Nelson MD In 1 month.    Specialty:  Gastroenterology  Why:  Call to schedule follow up appointment   Contact information:  [address] [place], [place] [address]  [address]  [facility]  [facility] [address]  [phone]             [facility] On 11/7/2017.    Specialty:  Lab  Why:  Appointment scheduled for Tuesday November 7th at 9am.   Contact information:  [address] [address] [address]  [phone]               Notified pts nurse Glynn that all CM needs have been addressed and that she may proceed with nursing d/c instructions and teaching to complete d/c process

## 2017-11-01 LAB
BACTERIA BLD CULT: NORMAL
BACTERIA BLD CULT: NORMAL

## 2017-11-01 NOTE — ANESTHESIA POSTPROCEDURE EVALUATION
"Anesthesia Post Evaluation    Patient: Ninfa Philip    Procedure(s) Performed: Procedure(s) (LRB):  ESOPHAGOGASTRODUODENOSCOPY (EGD) (Left)    Final Anesthesia Type: general  Patient location during evaluation: GI PACU  Patient participation: Yes- Able to Participate  Level of consciousness: awake and alert, oriented and awake  Post-procedure vital signs: reviewed and stable  Airway patency: patent  PONV status at discharge: No PONV  Anesthetic complications: no      Cardiovascular status: blood pressure returned to baseline  Respiratory status: unassisted, spontaneous ventilation and room air  Hydration status: euvolemic  Follow-up not needed.        Visit Vitals  BP (!) 126/58 (BP Location: Left arm, Patient Position: Lying)   Pulse 62   Temp 36.8 °C (98.3 °F) (Oral)   Resp 18   Ht 5' 5.98" (1.676 m)   Wt 65.7 kg (144 lb 13.5 oz)   SpO2 98%   Breastfeeding? No   BMI 23.39 kg/m²       Pain/Oneil Score: No Data Recorded      "

## 2017-11-07 NOTE — DISCHARGE SUMMARY
[facility] Medicine  Discharge Summary      Patient Name: Ninfa Philip  MRN: [id number]  Admission Date: 10/27/2017  Hospital Length of Stay: 3 days  Discharge Date and Time: 10/30/2017  Attending Physician: Melita Blank   Discharging Provider: Melita Blank MD  Primary Care Provider: Jt Sharif MD      HPI:   68 yo female with DM2, HTN, HLP and chronic gout presented with epigastric pain, N/V x 2 days. She came to ED and xray showed retained stool so she took dulcolax and had BM but still has 8/10 pain. She reports pain is sharp and when trying to eat she feels like food is getting stuck in lower chest. She reports decreased appetite and thinks her stool maybe darker than usual. No great historian of information. On admit, she appears to have UTI but denies dysuria, fever and chills. She has normal WBC, elevated Cr 1.7. Abdominal Xray did not show obstruction. She was admitted with UTI and Gi consultation .     Procedure(s) (LRB):  ESOPHAGOGASTRODUODENOSCOPY (EGD) (Left)      Hospital Course:   Pt admitted with UTI and abdominal pain. Improved with bentyl. GI to do EGD on Monday 10/30.  Diet advanced as tolerated. Plan for EGD in am. Cipro for uti though culture is negative, UA was strongly positive.     10/30 - EGD:  - Non-obstructing Schatzki ring. Dilated.                       - Erythematous mucosa in the antrum. Biopsied.                       - Non-bleeding gastric ulcer with no stigmata of                        bleeding.                       - Normal examined duodenum.    Pt will continue on PPI BID and follow up GI for biopsy results and repeat EGD.         Consults:     No new Assessment & Plan notes have been filed under this hospital service since the last note was generated.  Service: Hospital Medicine    Final Active Diagnoses:    Diagnosis Date Noted POA    Hyperlipidemia [E78.5] 05/05/2017 Yes    Hypertension [I10] 02/20/2017 Yes    Type 2 diabetes  mellitus without complication, without long-term current use of insulin [E11.9] 01/06/2017 Yes    Chronic gout [M1A.9XX0] 01/06/2017 Yes      Problems Resolved During this Admission:    Diagnosis Date Noted Date Resolved POA    PRINCIPAL PROBLEM:  Acute cystitis without hematuria [N30.00] 10/27/2017 10/30/2017 Yes    Abdominal pain [R10.9] 10/27/2017 10/30/2017 Yes    Dehydration [E86.0] 10/28/2017 10/30/2017 Yes    Acute kidney injury [N17.9] 10/27/2017 10/30/2017 Yes       Discharged Condition: good    Disposition: Home or Self Care    Follow Up:  Follow-up Information     Jt Sharif MD On 11/9/2017.    Specialty:  Family Medicine  Why:  Appointment scheduled for Thursday November 9th thang 2:45pm. Please keep scheduled follow up appointment   Contact information:  [address] [address] [address]  [phone]             Dickson Nelson MD In 1 month.    Specialty:  Gastroenterology  Why:  Call to schedule follow up appointment   Contact information:  [address] [place] [address]  [address]  [facility]  [facility] [address]  [phone]             [facility] On 11/7/2017.    Specialty:  Lab  Why:  Appointment scheduled for Tuesday November 7th at 9am.   Contact information:  [address] [place] [address]  [phone]               Patient Instructions:     Diet Diabetic 1800 Calories     Diet Cardiac     Activity as tolerated     Call MD for:  persistent nausea and vomiting or diarrhea     Call MD for:  severe uncontrolled pain           Pending Diagnostic Studies:     None         Medications:  Reconciled Home Medications:   Discharge Medication List as of 10/30/2017  3:23 PM      START taking these medications    Details   dicyclomine (BENTYL) 10 MG capsule Take 1 capsule (10 mg total) by mouth 4 (four) times daily as needed., Starting Mon 10/30/2017, Until Wed 11/29/2017, Print      pantoprazole (PROTONIX) 40 MG tablet Take 1 tablet (40 mg total)  by mouth 2 (two) times daily., Starting Mon 10/30/2017, Until Tue 10/30/2018, Print         CONTINUE these medications which have NOT CHANGED    Details   allopurinol (ZYLOPRIM) 300 MG tablet Take 1 tablet (300 mg total) by mouth once daily., Starting 2/27/2017, Until Discontinued, Normal      bumetanide (BUMEX) 0.5 MG Tab 0.5 mg po q am PRN swelling, Normal      carvedilol (COREG) 6.25 MG tablet Take 6.25 mg by mouth 2 (two) times daily., Until Discontinued, Historical Med      glipiZIDE (GLUCOTROL) 2.5 MG TR24 Take 2.5 mg by mouth daily with breakfast. , Until Discontinued, Historical Med      lisinopril (PRINIVIL,ZESTRIL) 40 MG tablet Take 40 mg by mouth once daily., Until Discontinued, Historical Med      pravastatin (PRAVACHOL) 40 MG tablet Take 40 mg by mouth once daily., Until Discontinued, Historical Med         STOP taking these medications       aspirin (ECOTRIN) 81 MG EC tablet Comments:   Reason for Stopping:         ketorolac (TORADOL) 10 mg tablet Comments:   Reason for Stopping:               Indwelling Lines/Drains at time of discharge:   Lines/Drains/Airways          No matching active lines, drains, or airways          Time spent on the discharge of patient: 40 minutes  Patient was seen and examined on the date of discharge and determined to be suitable for discharge.         Melita Blank MD  Department of Hospital Medicine  [facility]

## 2019-06-08 ENCOUNTER — OFFICE VISIT (OUTPATIENT)
Dept: URGENT CARE | Facility: CLINIC | Age: 69
End: 2019-06-08
Payer: MEDICARE

## 2019-06-08 VITALS
RESPIRATION RATE: 16 BRPM | OXYGEN SATURATION: 98 % | DIASTOLIC BLOOD PRESSURE: 70 MMHG | TEMPERATURE: 98 F | SYSTOLIC BLOOD PRESSURE: 115 MMHG | HEART RATE: 80 BPM | HEIGHT: 66 IN | WEIGHT: 144 LBS | BODY MASS INDEX: 23.14 KG/M2

## 2019-06-08 DIAGNOSIS — M79.642 LEFT HAND PAIN: Primary | ICD-10-CM

## 2019-06-08 DIAGNOSIS — M25.532 LEFT WRIST PAIN: ICD-10-CM

## 2019-06-08 PROCEDURE — 99214 OFFICE O/P EST MOD 30 MIN: CPT | Mod: S$GLB,,, | Performed by: EMERGENCY MEDICINE

## 2019-06-08 PROCEDURE — 99214 PR OFFICE/OUTPT VISIT, EST, LEVL IV, 30-39 MIN: ICD-10-PCS | Mod: S$GLB,,, | Performed by: EMERGENCY MEDICINE

## 2019-06-08 RX ORDER — FLUTICASONE PROPIONATE 50 MCG
1 SPRAY, SUSPENSION (ML) NASAL DAILY
COMMUNITY

## 2019-06-08 NOTE — PROGRESS NOTES
"Subjective:       Patient ID: Ninfa Philip is a 68 y.o. female.    Vitals:  height is 5' 6" (1.676 m) and weight is 65.3 kg (144 lb). Her temperature is 98 °F (36.7 °C). Her blood pressure is 115/70 and her pulse is 80. Her respiration is 16 and oxygen saturation is 98%.     Chief Complaint: Hand Pain (left thumb)    HPI  <OUCOOHADULT>    Objective:      Physical Exam    Assessment:       No diagnosis found.    Plan:         There are no diagnoses linked to this encounter.    Barry Beauchamp MD  Go to the Emergency Department for any problems  Call your PCP for follow up next available.    "

## 2019-06-08 NOTE — PATIENT INSTRUCTIONS
Barry Beauchamp MD  Go to the Emergency Department for any problems  Call your PCP for follow up next available.    Understanding Carpometacarpal Osteoarthritis    The base of the thumb where it meets the hand is called the carpometacarpal (CMC) joint. This joint allows the thumb to move freely in many directions. It also provides strength so the hand can grasp and .  A smooth tissue called cartilage lines and cushions the bones of the CMC joint. Using the thumb puts stress on the joint. Over time, this can lead to the breakdown of the cartilage in the joint. This is known as osteoarthritis. With this condition, bones of the joint may be exposed and rub together. They may become irritated and rough. This keeps the joint from moving smoothly and can lead to pain.     How to say it  [id number]      What causes CMC joint osteoarthritis?    This type of osteoarthritis is mainly caused by years of using the hand and thumb. The condition may be more likely if you:  · Regularly do things that put great stress on the thumb joint  · Have had previous thumb injuries  · Have weakened or loose structures in the thumb  · Are a woman who is past menopause  Symptoms of CMC joint osteoarthritis  Symptoms commonly include:  · Thumb pain. It may get worse with pinching or gripping.  · Thumb weakness  · Sounds of grinding or popping in the thumb joint  · Base of the thumb is enlarged   Treatment for CMC joint osteoarthritis  Osteoarthritis is a long-term (chronic) condition. Treatment focuses on managing symptoms. It may include:  · Taking prescription or over-the-counter pain medicines to help reduce pain and swelling  · Using a brace to rest and support the thumb joint  · Using hot or cold therapy to help relieve pain  · Learning and practicing ways to reduce stress on the thumb joint  · Using devices that help protect the joint. These include jar openers, pen , and spring-action scissors.  · Following a plan of  physical therapy and exercises. This will help improve the flexibility and strength of the hand and thumb.  · Getting shots of medicine into the joint to help relieve symptoms for a time  If these treatments dont do enough to relieve severe pain, you may need surgery. There are several different types of surgery. In general, the goal is to relieve pain and help you to be able to use the hand.     When to call your healthcare provider  Call your healthcare provider right away if you have any of these:  · Fever of 100.4°F (38°C) or higher, or as directed  · Symptoms that dont get better, or get worse  · New symptoms   Date Last Reviewed: 3/10/2016  © 0932-2079 [employer]. [address] [place] [address], [place] [address]. All rights reserved. This information is not intended as a substitute for professional medical care. Always follow your healthcare professional's instructions.

## 2019-06-08 NOTE — PROGRESS NOTES
"Subjective:       Patient ID: Ninfa Philip is a 68 y.o. female.    Vitals:  height is 5' 6" (1.676 m) and weight is 65.3 kg (144 lb). Her temperature is 98 °F (36.7 °C). Her blood pressure is 115/70 and her pulse is 80. Her respiration is 16 and oxygen saturation is 98%.     Chief Complaint: Hand Pain (left thumb)    Pt is complaining of left thumb pain that radiates to her wrist. This has been going on for 3 wks. Pt had xrays done by PCP last week and were nml, seen by PCP, right handed, no recall of trauma, NOC.    Hand Pain    The incident occurred more than 1 week ago. There was no injury mechanism. The pain is present in the left fingers. Radiates to: left wrist. The pain has been constant since the incident. Pertinent negatives include no chest pain, numbness or tingling. The symptoms are aggravated by movement and lifting.       Constitution: Negative for chills, fatigue and fever.   HENT: Negative for congestion and sore throat.    Neck: Negative for painful lymph nodes.   Cardiovascular: Negative for chest pain and leg swelling.   Eyes: Negative for double vision and blurred vision.   Respiratory: Negative for cough and shortness of breath.    Gastrointestinal: Negative for nausea, vomiting and diarrhea.   Genitourinary: Negative for dysuria, frequency, urgency and history of kidney stones.   Musculoskeletal: Positive for pain and joint pain. Negative for joint swelling, muscle cramps and muscle ache.   Skin: Negative for color change, pale, rash and bruising.   Allergic/Immunologic: Negative for seasonal allergies.   Neurological: Negative for dizziness, history of vertigo, light-headedness, passing out, headaches and numbness.   Hematologic/Lymphatic: Negative for swollen lymph nodes.   Psychiatric/Behavioral: Negative for nervous/anxious, sleep disturbance and depression. The patient is not nervous/anxious.        Objective:      Physical Exam   Constitutional: She is oriented to person, place, and " time. She appears well-developed and well-nourished.   HENT:   Head: Normocephalic and atraumatic.   Neck: Normal range of motion. Neck supple.   Cardiovascular: Normal rate, regular rhythm, normal heart sounds and intact distal pulses.   Pulmonary/Chest: Breath sounds normal.   Musculoskeletal:        Hands:  Left lateral radial wrist area and 1st MC to 1st MCP joint area tender to palp and ROM, decrease left thumb ROM, no edema, remainder left wrist/forearm/hand/fingers non tender, FROM   Neurological: She is alert and oriented to person, place, and time. No sensory deficit.   Skin: Skin is warm and dry. Capillary refill takes less than 2 seconds.   Psychiatric: She has a normal mood and affect. Her behavior is normal.       Assessment:       1. Left hand pain    2. Left wrist pain        Plan:         Left hand pain  -     Ambulatory referral to Hand Surgery  -     ORTHOPEDIC BRACING FOR HOME USE - UPPER EXTREMITY    Left wrist pain  -     Ambulatory referral to Hand Surgery  -     ORTHOPEDIC BRACING FOR HOME USE - UPPER EXTREMITY        Barry Beauchamp MD  Go to the Emergency Department for any problems  Call your PCP for follow up next available.

## 2019-06-11 ENCOUNTER — TELEPHONE (OUTPATIENT)
Dept: URGENT CARE | Facility: CLINIC | Age: 69
End: 2019-06-11

## 2019-06-11 NOTE — TELEPHONE ENCOUNTER
Called for follow up. Pt stated her hand is still hurting, and she is going to call today to schedule and appointment with orthopedic.

## 2019-06-12 DIAGNOSIS — R52 PAIN: Primary | ICD-10-CM

## 2019-06-13 ENCOUNTER — HOSPITAL ENCOUNTER (OUTPATIENT)
Dept: RADIOLOGY | Facility: OTHER | Age: 69
Discharge: HOME | End: 2019-06-13
Attending: PHYSICIAN ASSISTANT
Payer: MEDICARE

## 2019-06-13 ENCOUNTER — OFFICE VISIT (OUTPATIENT)
Dept: ORTHOPEDICS | Facility: CLINIC | Age: 69
End: 2019-06-13
Payer: MEDICARE

## 2019-06-13 VITALS
SYSTOLIC BLOOD PRESSURE: 154 MMHG | HEIGHT: 66 IN | DIASTOLIC BLOOD PRESSURE: 78 MMHG | HEART RATE: 61 BPM | BODY MASS INDEX: 23.14 KG/M2 | WEIGHT: 144 LBS

## 2019-06-13 DIAGNOSIS — M19.049 CMC ARTHRITIS: ICD-10-CM

## 2019-06-13 DIAGNOSIS — R52 PAIN: ICD-10-CM

## 2019-06-13 DIAGNOSIS — M65.4 DE QUERVAIN'S DISEASE (RADIAL STYLOID TENOSYNOVITIS): ICD-10-CM

## 2019-06-13 DIAGNOSIS — M65.4 DE QUERVAIN'S DISEASE (RADIAL STYLOID TENOSYNOVITIS): Primary | ICD-10-CM

## 2019-06-13 PROCEDURE — 73130 X-RAY EXAM OF HAND: CPT | Mod: TC,FY,LT

## 2019-06-13 PROCEDURE — 99999 PR PBB SHADOW E&M-EST. PATIENT-LVL IV: ICD-10-PCS | Mod: PBBFAC,,, | Performed by: PHYSICIAN ASSISTANT

## 2019-06-13 PROCEDURE — 99999 PR PBB SHADOW E&M-EST. PATIENT-LVL IV: CPT | Mod: PBBFAC,,, | Performed by: PHYSICIAN ASSISTANT

## 2019-06-13 PROCEDURE — 99203 OFFICE O/P NEW LOW 30 MIN: CPT | Mod: S$PBB,,, | Performed by: PHYSICIAN ASSISTANT

## 2019-06-13 PROCEDURE — 73130 XR HAND COMPLETE 3 VIEW LEFT: ICD-10-PCS | Mod: 26,LT,, | Performed by: RADIOLOGY

## 2019-06-13 PROCEDURE — 99203 PR OFFICE/OUTPT VISIT, NEW, LEVL III, 30-44 MIN: ICD-10-PCS | Mod: S$PBB,,, | Performed by: PHYSICIAN ASSISTANT

## 2019-06-13 PROCEDURE — 73130 X-RAY EXAM OF HAND: CPT | Mod: 26,LT,, | Performed by: RADIOLOGY

## 2019-06-13 PROCEDURE — 99214 OFFICE O/P EST MOD 30 MIN: CPT | Mod: PBBFAC,25 | Performed by: PHYSICIAN ASSISTANT

## 2019-06-13 RX ORDER — MELOXICAM 7.5 MG/1
TABLET ORAL
Qty: 90 TABLET | Refills: 0 | OUTPATIENT
Start: 2019-06-13

## 2019-06-13 RX ORDER — MELOXICAM 7.5 MG/1
7.5 TABLET ORAL DAILY
Qty: 28 TABLET | Refills: 0 | Status: SHIPPED | OUTPATIENT
Start: 2019-06-13 | End: 2021-02-26

## 2019-06-13 RX ORDER — MELOXICAM 7.5 MG/1
7.5 TABLET ORAL DAILY
Qty: 28 TABLET | Refills: 0 | Status: SHIPPED | OUTPATIENT
Start: 2019-06-13 | End: 2019-06-13 | Stop reason: CLARIF

## 2019-06-13 NOTE — PROGRESS NOTES
Subjective:      Patient ID: Ninfa Philip is a 68 y.o. female.    Chief Complaint: Pain of the Left Hand      HPI  Ninfa Philip is a right hand dominant 68 y.o. female presenting today for left thumb and wrist pain.  There was not a history of trauma.  Onset of symptoms began approximately 1 month ago.  She denies any new repetitive activity or specific inciting event. She was evaluated in urgent care on 06/08/2019, also evaluated by her primary care the week prior.  She reports that she was provided with a thumb spica brace, this has improved her pain.  She is not wearing it all the time. She takes Tylenol arthritis as needed for pain, with minimal improvement.  He also reports minimal improvement with heat or ice.       Review of patient's allergies indicates:   Allergen Reactions    Codeine      Not sure of reaction    Colchicine analogues Nausea And Vomiting    Doxycycline      Not sure of reaction    Equagesic [meprobamate-aspirin]      hyperactivity    Indocin [indomethacin sodium]      Not sure of reaction    Penicillins      Not sure of reaction    Tramadol Nausea And Vomiting    Vicodin [hydrocodone-acetaminophen]      Not sure of reaction         Current Outpatient Medications   Medication Sig Dispense Refill    allopurinol (ZYLOPRIM) 300 MG tablet Take 1 tablet (300 mg total) by mouth once daily. 90 tablet 3    bumetanide (BUMEX) 0.5 MG Tab TAKE 1 TABLET BY MOUTH EVERY MORNING AS NEEDED FOR SWELLING 30 tablet 1    carvedilol (COREG) 6.25 MG tablet Take 6.25 mg by mouth 2 (two) times daily.      fluticasone propionate (FLONASE) 50 mcg/actuation nasal spray 1 spray by Each Nare route once daily.      glipiZIDE (GLUCOTROL) 2.5 MG TR24 Take 2.5 mg by mouth daily with breakfast.       lisinopril (PRINIVIL,ZESTRIL) 40 MG tablet Take 40 mg by mouth once daily.      pravastatin (PRAVACHOL) 40 MG tablet Take 40 mg by mouth once daily.      meloxicam (MOBIC) 7.5 MG tablet Take 1 tablet (7.5 mg  "total) by mouth once daily. 28 tablet 0    pantoprazole (PROTONIX) 40 MG tablet Take 1 tablet (40 mg total) by mouth 2 (two) times daily. 60 tablet 3     No current facility-administered medications for this visit.        Past Medical History:   Diagnosis Date    Diabetes mellitus     Diabetes mellitus type I     Heart attack     2001, 2006    High cholesterol     Hypertension        Past Surgical History:   Procedure Laterality Date    BREAST SURGERY      ESOPHAGOGASTRODUODENOSCOPY (EGD) Left 10/30/2017    [facility]    HYSTERECTOMY      THYROID CYST EXCISION           Review of Systems:  Review of Systems   Constitution: Negative for chills and fever.   Skin: Negative for rash and suspicious lesions.   Musculoskeletal:        See HPI   Neurological: Negative for dizziness, headaches, light-headedness, numbness and paresthesias.   Psychiatric/Behavioral: Negative for depression. The patient is not nervous/anxious.          OBJECTIVE:     PHYSICAL EXAM:  Height: 5' 6" (167.6 cm) Weight: 65.3 kg (144 lb)  Vitals:    06/13/19 1351   BP: (!) 154/78   Pulse: 61   Weight: 65.3 kg (144 lb)   Height: 5' 6" (1.676 m)   PainSc:   9     General    Vitals reviewed.  Constitutional: She is oriented to person, place, and time. She appears well-developed and well-nourished.   HENT:   Head: Normocephalic and atraumatic.   Neck: Normal range of motion.   Cardiovascular: Normal rate.    Pulmonary/Chest: Effort normal. No respiratory distress.   Neurological: She is alert and oriented to person, place, and time.   Psychiatric: She has a normal mood and affect. Her behavior is normal. Judgment and thought content normal.             Musculoskeletal:  No scars or edema appreciated.  She is exquisitely tender to palpation over the left radial styloid and CMC joints. Otherwise nontender.  Significant pain with thumb and wrist motion. Positive Finkelstein's on the left.  Positive Eichhoff " maneuver on the left.  Negative grind bilaterally, right CMC is nontender to palpation.  Neurovascularly intact-good sensation and motor function, good capillary refill, 2+ radial pulses.    RADIOGRAPHS:  Left hand x-ray, 6/13/19  FINDINGS:  There is no acute fracture, dislocation, or bone destruction.  No erosions or soft tissue calcifications are seen.  There is mild interphalangeal joint space narrowing consistent with early osteoarthritis.      Impression     Mild osteoarthritis.     Comments: I have personally reviewed the imaging and I agree with the above radiologist's report.    ASSESSMENT/PLAN:   Ninfa was seen today for pain.    Diagnoses and all orders for this visit:    De Quervain's disease (radial styloid tenosynovitis)  -     Discontinue: meloxicam (MOBIC) 7.5 MG tablet; Take 1 tablet (7.5 mg total) by mouth once daily.  -     meloxicam (MOBIC) 7.5 MG tablet; Take 1 tablet (7.5 mg total) by mouth once daily.    CMC arthritis  -     Discontinue: meloxicam (MOBIC) 7.5 MG tablet; Take 1 tablet (7.5 mg total) by mouth once daily.  -     meloxicam (MOBIC) 7.5 MG tablet; Take 1 tablet (7.5 mg total) by mouth once daily.           - We talked at length about the anatomy and pathophysiology of   Encounter Diagnoses   Name Primary?    De Quervain's disease (radial styloid tenosynovitis) Yes    CMC arthritis        - discussed patient's symptoms and physical exam findings, concern for de Quervain tenosynovitis and left CMC arthritis. Patient information provided.   - discussed conservative and surgical treatment options.  Continue thumb spica brace times 4-6 weeks- public, activity, and sleep.  Discussed use of anti-inflammatories.  Patient is not interested in steroid injection, fear of injections.  - Mobic sent to pharmacy  - follow-up in 4 weeks for re-evaluation  - call with questions or concerns    Disclaimer: This note has been generated using voice-recognition software. There may be typographical  errors that have been missed during proof-reading.

## 2019-06-13 NOTE — PATIENT INSTRUCTIONS
Understanding Carpometacarpal Osteoarthritis    The base of the thumb where it meets the hand is called the carpometacarpal (CMC) joint. This joint allows the thumb to move freely in many directions. It also provides strength so the hand can grasp and .  A smooth tissue called cartilage lines and cushions the bones of the CMC joint. Using the thumb puts stress on the joint. Over time, this can lead to the breakdown of the cartilage in the joint. This is known as osteoarthritis. With this condition, bones of the joint may be exposed and rub together. They may become irritated and rough. This keeps the joint from moving smoothly and can lead to pain.     How to say it  [id number]      What causes CMC joint osteoarthritis?    This type of osteoarthritis is mainly caused by years of using the hand and thumb. The condition may be more likely if you:  · Regularly do things that put great stress on the thumb joint  · Have had previous thumb injuries  · Have weakened or loose structures in the thumb  · Are a woman who is past menopause  Symptoms of CMC joint osteoarthritis  Symptoms commonly include:  · Thumb pain. It may get worse with pinching or gripping.  · Thumb weakness  · Sounds of grinding or popping in the thumb joint  · Base of the thumb is enlarged   Treatment for CMC joint osteoarthritis  Osteoarthritis is a long-term (chronic) condition. Treatment focuses on managing symptoms. It may include:  · Taking prescription or over-the-counter pain medicines to help reduce pain and swelling  · Using a brace to rest and support the thumb joint  · Using hot or cold therapy to help relieve pain  · Learning and practicing ways to reduce stress on the thumb joint  · Using devices that help protect the joint. These include jar openers, pen , and spring-action scissors.  · Following a plan of physical therapy and exercises. This will help improve the flexibility and strength of the hand and  thumb.  · Getting shots of medicine into the joint to help relieve symptoms for a time  If these treatments dont do enough to relieve severe pain, you may need surgery. There are several different types of surgery. In general, the goal is to relieve pain and help you to be able to use the hand.     When to call your healthcare provider  Call your healthcare provider right away if you have any of these:  · Fever of 100.4°F (38°C) or higher, or as directed  · Symptoms that dont get better, or get worse  · New symptoms   Date Last Reviewed: 3/10/2016  © 5348-8950 [employer]. [address] [place] [address]. All rights reserved. This information is not intended as a substitute for professional medical care. Always follow your healthcare professional's instructions.        Understanding De Quervain Tenosynovitis    De Quervain tenosynovitis is a condition that can cause wrist and thumb pain. Tendons connect muscles in your wrist and forearm to the bones in your thumb. The tendons have a protective cover (sheath). The sheaths lining makes a fluid that lets the tendons slide easily when you straighten your wrist and thumb. If any of these tendons are irritated or injured, they can become swollen and inflamed. This is called de Quervain tenosynovitis.  How to say it  [id number]-VY-[id number]   What causes de Quervain tenosynovitis?  This condition is most often caused from overuse. For example, making the same wrist motions over and over can irritate the tendons. This includes doing things like unscrewing jar lids or grasping a tool. Activities such as typing, playing racquet sports, knitting, and texting can also lead to the condition.  Symptoms of de Quervain tenosynovitis  You may have pain, soreness, redness, and swelling along the side of your wrist and the base of your thumb. You may feel pain when you pinch or grasp things, turn or touch your wrist, or make a fist. Your thumb may  catch or make a crackling sound when you move it.  Treatment for de Quervain tenosynovitis  Treatments may include:  · Resting the wrist and thumb. This involves limiting movements that make your symptoms worse. You also may need to avoid certain hobbies, sports, and types of work for a time.  · Cold packs. These help reduce pain and swelling.  · Prescription or over-the-counter pain medicines. These help relieve pain and swelling.  · Splint or brace. This helps keep the thumb and wrist from moving and gives time for your tendons to heal.  · Exercises or physical therapy. These help stretch, strengthen, and improve the range of motion in your wrist and thumb.  · Shots of medicine into the area around the tendon. These may help relieve symptoms for a time.  · Surgery. You may need surgery if other treatments dont relieve symptoms. During surgery, the surgeon releases the sheath that surrounds the tendons so the tendons can move more easily.  Possible complications of de Quervain tenosynovitis  Without proper care and treatment, healing may take longer than normal. Also, symptoms may continue or get worse. Over time, the problem may become long-term (chronic). This can make it hard to use your wrist and thumb for normal activities.  When to call your healthcare provider  Call your healthcare provider right away if you have any of these:  · Fever of 100.4°F (38°C) or higher, or as directed  · Symptoms that dont get better with treatment, or get worse  · Pain, numbness, or coldness in the hand  · New symptoms   Date Last Reviewed: 3/10/2016  © 4055-2679 [employer]. [address] [place] [address], [place] [address]. All rights reserved. This information is not intended as a substitute for professional medical care. Always follow your healthcare professional's instructions.

## 2019-06-13 NOTE — LETTER
June 13, 2019      Barry Beauchamp MD  [address]  [place] [address]           [facility] [address]  [address], [address]  [facility] [address]  Phone: [phone]          Patient: Ninfa Philip   MR Number: [id number]   YOB: 1950   Date of Visit: 6/13/2019       Dear Dr. Barry Beauchamp:    Thank you for referring Ninfa Philip to me for evaluation. Attached you will find relevant portions of my assessment and plan of care.    If you have questions, please do not hesitate to call me. I look forward to following Ninfa Philip along with you.    Sincerely,    YAMILEX Mcgill    Enclosure  CC:  No Recipients    If you would like to receive this communication electronically, please contact [email or web address] or [phone] to request more information on [employer] access.    For providers and/or their staff who would like to refer a patient to [facility], please contact us through our one-stop-shop provider referral line, [facility] , at [phone].    If you feel you have received this communication in error or would no longer like to receive these types of communications, please e-mail [email or web address]

## 2019-06-26 ENCOUNTER — OFFICE VISIT (OUTPATIENT)
Dept: ORTHOPEDICS | Facility: CLINIC | Age: 69
End: 2019-06-26
Payer: MEDICARE

## 2019-06-26 VITALS
DIASTOLIC BLOOD PRESSURE: 85 MMHG | SYSTOLIC BLOOD PRESSURE: 158 MMHG | BODY MASS INDEX: 23.14 KG/M2 | HEART RATE: 65 BPM | WEIGHT: 144 LBS | HEIGHT: 66 IN

## 2019-06-26 DIAGNOSIS — M65.4 DE QUERVAIN'S DISEASE (RADIAL STYLOID TENOSYNOVITIS): Primary | ICD-10-CM

## 2019-06-26 DIAGNOSIS — M18.12 ARTHRITIS OF CARPOMETACARPAL (CMC) JOINT OF LEFT THUMB: ICD-10-CM

## 2019-06-26 PROCEDURE — 99999 PR PBB SHADOW E&M-EST. PATIENT-LVL IV: CPT | Mod: PBBFAC,,, | Performed by: PHYSICIAN ASSISTANT

## 2019-06-26 PROCEDURE — 97760 ORTHOTIC MGMT&TRAING 1ST ENC: CPT | Mod: GP,,, | Performed by: PHYSICIAN ASSISTANT

## 2019-06-26 PROCEDURE — 99213 PR OFFICE/OUTPT VISIT, EST, LEVL III, 20-29 MIN: ICD-10-PCS | Mod: S$PBB,25,, | Performed by: PHYSICIAN ASSISTANT

## 2019-06-26 PROCEDURE — 99213 OFFICE O/P EST LOW 20 MIN: CPT | Mod: S$PBB,25,, | Performed by: PHYSICIAN ASSISTANT

## 2019-06-26 PROCEDURE — 99214 OFFICE O/P EST MOD 30 MIN: CPT | Mod: PBBFAC | Performed by: PHYSICIAN ASSISTANT

## 2019-06-26 PROCEDURE — 99999 PR PBB SHADOW E&M-EST. PATIENT-LVL IV: ICD-10-PCS | Mod: PBBFAC,,, | Performed by: PHYSICIAN ASSISTANT

## 2019-06-26 PROCEDURE — 97760 PR ORTHOTIC MGMT&TRAINJ INITIAL ENC EA 15 MINS: ICD-10-PCS | Mod: GP,,, | Performed by: PHYSICIAN ASSISTANT

## 2019-06-26 NOTE — PROGRESS NOTES
Subjective:      Patient ID: Ninfa Philip is a 68 y.o. female.    Chief Complaint: Pain of the Left Hand      HPI  Ninfa Philip is a right hand dominant 68 y.o. female presenting today for follow up of left thumb and wrist pain.  There was not a history of trauma.  Onset of symptoms began approximately 1.5 months ago.  She denies any new repetitive activity or specific inciting event. She was evaluated in urgent care on 06/08/2019, also evaluated by her primary care the week prior.  She previously reported that she was provided with a thumb spica brace, but it is actually a short wrist brace (she presents with it today).  The brace has improved her pain.  She is wearing it regularly. She takes Tylenol arthritis as needed for pain, with minimal improvement.  He also reports minimal improvement with heat or ice.       Review of patient's allergies indicates:   Allergen Reactions    Codeine      Not sure of reaction    Colchicine analogues Nausea And Vomiting    Doxycycline      Not sure of reaction    Equagesic [meprobamate-aspirin]      hyperactivity    Indocin [indomethacin sodium]      Not sure of reaction    Penicillins      Not sure of reaction    Tramadol Nausea And Vomiting    Vicodin [hydrocodone-acetaminophen]      Not sure of reaction         Current Outpatient Medications   Medication Sig Dispense Refill    allopurinol (ZYLOPRIM) 300 MG tablet Take 1 tablet (300 mg total) by mouth once daily. 90 tablet 3    bumetanide (BUMEX) 0.5 MG Tab TAKE 1 TABLET BY MOUTH EVERY MORNING AS NEEDED FOR SWELLING 30 tablet 1    carvedilol (COREG) 6.25 MG tablet Take 6.25 mg by mouth 2 (two) times daily.      fluticasone propionate (FLONASE) 50 mcg/actuation nasal spray 1 spray by Each Nare route once daily.      glipiZIDE (GLUCOTROL) 2.5 MG TR24 Take 2.5 mg by mouth daily with breakfast.       lisinopril (PRINIVIL,ZESTRIL) 40 MG tablet Take 40 mg by mouth once daily.      meloxicam (MOBIC) 7.5 MG tablet  "Take 1 tablet (7.5 mg total) by mouth once daily. 28 tablet 0    pravastatin (PRAVACHOL) 40 MG tablet Take 40 mg by mouth once daily.      pantoprazole (PROTONIX) 40 MG tablet Take 1 tablet (40 mg total) by mouth 2 (two) times daily. 60 tablet 3     No current facility-administered medications for this visit.        Past Medical History:   Diagnosis Date    Diabetes mellitus     Diabetes mellitus type I     Heart attack     2001, 2006    High cholesterol     Hypertension        Past Surgical History:   Procedure Laterality Date    BREAST SURGERY      ESOPHAGOGASTRODUODENOSCOPY (EGD) Left 10/30/2017    Performed by Elton [facility]ley MD at [facility]    HYSTERECTOMY      THYROID CYST EXCISION           Review of Systems:  Review of Systems   Constitution: Negative for chills and fever.   Skin: Negative for rash and suspicious lesions.   Musculoskeletal:        See HPI   Neurological: Negative for dizziness, headaches, light-headedness, numbness and paresthesias.   Psychiatric/Behavioral: Negative for depression. The patient is not nervous/anxious.          OBJECTIVE:     PHYSICAL EXAM:  Height: 5' 6" (167.6 cm) Weight: 65.3 kg (144 lb)  Vitals:    06/26/19 1046   BP: (!) 158/85   Pulse: 65   Weight: 65.3 kg (144 lb)   Height: 5' 6" (1.676 m)   PainSc:   6     General    Vitals reviewed.  Constitutional: She is oriented to person, place, and time. She appears well-developed and well-nourished.   HENT:   Head: Normocephalic and atraumatic.   Neck: Normal range of motion.   Cardiovascular: Normal rate.    Pulmonary/Chest: Effort normal. No respiratory distress.   Neurological: She is alert and oriented to person, place, and time.   Psychiatric: She has a normal mood and affect. Her behavior is normal. Judgment and thought content normal.             Musculoskeletal:  No scars or edema appreciated.  She is exquisitely tender to palpation over the left radial styloid and CMC joints. Otherwise nontender.  " Significant pain with thumb and wrist motion. Positive Finkelstein's on the left.  Positive Eichhoff maneuver on the left.  Negative grind bilaterally, right CMC is nontender to palpation.  Neurovascularly intact-good sensation and motor function, good capillary refill, 2+ radial pulses.    RADIOGRAPHS:  Left hand x-ray, 6/13/19  FINDINGS:  There is no acute fracture, dislocation, or bone destruction.  No erosions or soft tissue calcifications are seen.  There is mild interphalangeal joint space narrowing consistent with early osteoarthritis.      Impression     Mild osteoarthritis.     Comments: I have personally reviewed the imaging and I agree with the above radiologist's report.    ASSESSMENT/PLAN:   Ninfa was seen today for pain.    Diagnoses and all orders for this visit:    De Quervain's disease (radial styloid tenosynovitis)  -     Ambulatory Referral to Physical/Occupational Therapy    Arthritis of carpometacarpal (CMC) joint of left thumb  -     Ambulatory Referral to Physical/Occupational Therapy           - We talked at length about the anatomy and pathophysiology of   Encounter Diagnoses   Name Primary?    De Quervain's disease (radial styloid tenosynovitis) Yes    Arthritis of carpometacarpal (CMC) joint of left thumb        - discussed patient's symptoms and physical exam findings, concern for de Quervain tenosynovitis and left CMC arthritis. Patient information provided.   - Thumb spica brace provided (15 minutes spent preparing, fitting, and educating on brace).  - discussed conservative and surgical treatment options.  Discontinue wrist brace, start using thumb spica brace - public, activity, and sleep.  Discussed use of anti-inflammatories, topical cream.  Patient is not interested in steroid injection, fear of injections.  - Compound cream sent to pharmacy  - Orders for OT  - follow-up in 6 weeks for re-evaluation  - call with questions or concerns    Disclaimer: This note has been generated  using voice-recognition software. There may be typographical errors that have been missed during proof-reading.

## 2019-07-09 ENCOUNTER — TELEPHONE (OUTPATIENT)
Dept: ORTHOPEDICS | Facility: CLINIC | Age: 69
End: 2019-07-09

## 2019-07-09 NOTE — TELEPHONE ENCOUNTER
Lm on vm informing pt that [facility] is trying to fill her pain cream but can not get in touch with her.  Pt was inform to let us know if she still want it.

## 2019-08-13 ENCOUNTER — OFFICE VISIT (OUTPATIENT)
Dept: URGENT CARE | Facility: CLINIC | Age: 69
End: 2019-08-13
Payer: MEDICARE

## 2019-08-13 VITALS
OXYGEN SATURATION: 98 % | HEART RATE: 58 BPM | RESPIRATION RATE: 15 BRPM | BODY MASS INDEX: 23.14 KG/M2 | WEIGHT: 144 LBS | HEIGHT: 66 IN | DIASTOLIC BLOOD PRESSURE: 66 MMHG | TEMPERATURE: 99 F | SYSTOLIC BLOOD PRESSURE: 115 MMHG

## 2019-08-13 DIAGNOSIS — R42 VERTIGO: Primary | ICD-10-CM

## 2019-08-13 PROCEDURE — 99214 OFFICE O/P EST MOD 30 MIN: CPT | Mod: S$GLB,,, | Performed by: EMERGENCY MEDICINE

## 2019-08-13 PROCEDURE — 99214 PR OFFICE/OUTPT VISIT, EST, LEVL IV, 30-39 MIN: ICD-10-PCS | Mod: S$GLB,,, | Performed by: EMERGENCY MEDICINE

## 2019-08-13 NOTE — PATIENT INSTRUCTIONS
Increase your meclizine to 25 mg 3-4 times a day as needed for vertigo.    Barry Beauchamp MD  Go to the Emergency Department for any problems  Call your PCP for follow up next available.    Vertigo (Unknown Cause)    In addition to helping with hearing, the inner ear is part of the balance center of your body. Problems with the inner ear can a false feeling of motion. This is called vertigo. Often, it feels as if you or the room is spinning. A vertigo attack may cause sudden nausea, vomiting and heavy sweating. Severe vertigo causes a loss of balance and can cause you to fall. During vertigo, small head movements and changes in body position will often make the symptoms worse. You may also have ringing in the ears called tinnitus.  An episode of vertigo may last seconds, minutes or hours. Once you are over the first episode, it may never come back. However, symptoms may return off and on.  The cause of your vertigo is not yet known. Possible causes of vertigo include:  · Inflammation of the inner ear  · Disease of the nerves to the inner ear  · Movement of calcium particles in the inner ear  · Poor blood flow to the balance centers of the brain  · Migraine headaches  Home care  · If symptoms are severe, rest quietly in bed. Change positions very slowly. There is usually one position that will feel best, such as lying on one side or lying on your back with your head slightly raised on pillows.  · Do not drive a car or work with dangerous machinery until symptoms have been gone for at least one week.  · Take medicine as prescribed to relieve your symptoms. Unless another medicine was prescribed for symptoms of nausea, vomiting, and dizziness, you may use over-the-counter motion sickness pills. Ask your pharmacist for suggestions.  Follow-up care  Follow up with your healthcare provider or as directed. If you are referred to a specialist or for testing, make the appointment promptly.  When to seek medical advice  Call  your healthcare provider if any of the following occur:  · Fever of 100.4°F (38ºC) or higher, or as directed by your healthcare provider  · Vertigo worsens or is not controlled by prescribed medicine   · Repeated vomiting not relieved by prescribed medicine   · Severe headache  · Confusion  · Weakness of an arm or leg or one side of the face  · Difficulty with speech or vision  · Loss of consciousness   · Seizure  Date Last Reviewed: 8/16/2015  © 3201-6483 [employer]. [address] [place] [address]. All rights reserved. This information is not intended as a substitute for professional medical care. Always follow your healthcare professional's instructions.        Managing Dizziness (Vertigo) with Medicines    Although medicines can't cure your problem, they can help control symptoms. Your doctor may prescribe medicines for a few weeks and then taper them off. Always take your medicine as prescribed. Never share your medicine with others.  Contact your healthcare provider right away if you have side effects from your medicines.   How medicines can help  · Treat infection or inflammation. If you have an infection caused by bacteria, your doctor can prescribe antibiotics.  · Limit conflicting balance signals. These medicines are often in pill form.  · Ease nausea. Suppositories, pills, or shots can reduce vomiting.  · Reduce pressure in the canals. Diuretics can be used to treat Meniere's disease. These medicines help your body get rid of extra fluid.  · Ease other symptoms. Other medicines can help ease depression and anxiety caused by living with dizziness or fainting.  Date Last Reviewed: 11/1/2016  © 6796-4511 [employer]. [address] [place] [address]. All rights reserved. This information is not intended as a substitute for professional medical care. Always follow your healthcare professional's instructions.

## 2019-08-13 NOTE — PROGRESS NOTES
"Subjective:       Patient ID: Ninfa Philip is a 68 y.o. female.    Vitals:  height is 5' 6" (1.676 m) and weight is 65.3 kg (144 lb). Her temperature is 98.8 °F (37.1 °C). Her blood pressure is 115/66 and her pulse is 58 (abnormal). Her respiration is 15 and oxygen saturation is 98%.     Chief Complaint: Dizziness    Patient has felt dizzy/off balance since last Sunday. Patient states to have a history of vertigo and does not feel like medication is working which is meclizine 12.5 mg 3 times a day, denies runny nose/sore throat/ear ache/numbness, NOC.    Dizziness:   Chronicity:  New  Onset: sunday.  Duration:  Off/on all day  Dizziness characteristics:  Off-balance, lightheaded/impending faint and walking on uneven surface   Associated symptoms: headaches and light-headedness.no fever, no tinnitus, no nausea, no vomiting, no diaphoresis and no weakness.  Treatments tried: tylonol.      Constitution: Negative for chills, sweating and fever.   HENT: Negative for tinnitus, facial swelling, congestion and sinus pain.    Neck: Negative for neck pain and neck stiffness.   Eyes: Negative for eye pain, photophobia, vision loss, double vision and blurred vision.   Gastrointestinal: Negative for nausea and vomiting.   Genitourinary: Negative for missed menses.   Musculoskeletal: Negative for trauma and muscle ache.   Skin: Negative for rash, wound and lesion.   Neurological: Positive for dizziness, history of vertigo, light-headedness and headaches. Negative for facial drooping, speech difficulty, coordination disturbances, loss of balance, history of migraines, disorientation and loss of consciousness.   Psychiatric/Behavioral: Negative for disorientation, confusion, nervous/anxious, sleep disturbance and depression. The patient is not nervous/anxious.        Objective:      Physical Exam   Constitutional: She is oriented to person, place, and time. She appears well-developed and well-nourished.   HENT:   Head: " Normocephalic and atraumatic.   Right Ear: Tympanic membrane, external ear and ear canal normal.   Left Ear: Tympanic membrane, external ear and ear canal normal.   Nose: Nose normal.   Mouth/Throat: Uvula is midline, oropharynx is clear and moist and mucous membranes are normal.   Eyes: Pupils are equal, round, and reactive to light. EOM are normal.   No bilat nystagmus   Neck: Normal range of motion. Neck supple.   Cardiovascular: Normal rate, regular rhythm and normal heart sounds.   Pulmonary/Chest: Breath sounds normal.   Musculoskeletal: Normal range of motion.   Neurological: She is alert and oriented to person, place, and time. No cranial nerve deficit or sensory deficit.   Skin: Skin is warm and dry.   Psychiatric: She has a normal mood and affect. Her behavior is normal.       Assessment:       1. Vertigo        Plan:         Vertigo        Barry Beauchamp MD  Go to the Emergency Department for any problems  Call your PCP for follow up next available.

## 2019-08-16 ENCOUNTER — TELEPHONE (OUTPATIENT)
Dept: URGENT CARE | Facility: CLINIC | Age: 69
End: 2019-08-16

## 2019-08-22 ENCOUNTER — OFFICE VISIT (OUTPATIENT)
Dept: ORTHOPEDICS | Facility: CLINIC | Age: 69
End: 2019-08-22
Payer: MEDICARE

## 2019-08-22 VITALS
HEIGHT: 66 IN | WEIGHT: 144 LBS | HEART RATE: 51 BPM | BODY MASS INDEX: 23.14 KG/M2 | SYSTOLIC BLOOD PRESSURE: 136 MMHG | DIASTOLIC BLOOD PRESSURE: 71 MMHG

## 2019-08-22 DIAGNOSIS — M18.12 ARTHRITIS OF CARPOMETACARPAL (CMC) JOINT OF LEFT THUMB: ICD-10-CM

## 2019-08-22 DIAGNOSIS — M25.522 LEFT ELBOW PAIN: ICD-10-CM

## 2019-08-22 DIAGNOSIS — M65.4 DE QUERVAIN'S DISEASE (RADIAL STYLOID TENOSYNOVITIS): Primary | ICD-10-CM

## 2019-08-22 PROBLEM — M79.642 LEFT HAND PAIN: Status: RESOLVED | Noted: 2019-06-08 | Resolved: 2019-08-22

## 2019-08-22 PROBLEM — M25.532 LEFT WRIST PAIN: Status: RESOLVED | Noted: 2019-06-08 | Resolved: 2019-08-22

## 2019-08-22 PROCEDURE — 99213 OFFICE O/P EST LOW 20 MIN: CPT | Mod: S$PBB,,, | Performed by: PHYSICIAN ASSISTANT

## 2019-08-22 PROCEDURE — 99999 PR PBB SHADOW E&M-EST. PATIENT-LVL III: ICD-10-PCS | Mod: PBBFAC,,, | Performed by: PHYSICIAN ASSISTANT

## 2019-08-22 PROCEDURE — 99999 PR PBB SHADOW E&M-EST. PATIENT-LVL III: CPT | Mod: PBBFAC,,, | Performed by: PHYSICIAN ASSISTANT

## 2019-08-22 PROCEDURE — 99213 PR OFFICE/OUTPT VISIT, EST, LEVL III, 20-29 MIN: ICD-10-PCS | Mod: S$PBB,,, | Performed by: PHYSICIAN ASSISTANT

## 2019-08-22 PROCEDURE — 99213 OFFICE O/P EST LOW 20 MIN: CPT | Mod: PBBFAC | Performed by: PHYSICIAN ASSISTANT

## 2019-08-22 NOTE — PROGRESS NOTES
Subjective:      Patient ID: Ninfa Philip is a 68 y.o. female.    Chief Complaint: Pain of the Left Elbow (For the last two days has been in pain. Pain level is 4.) and Follow-up (Has not started therapy yet. Left hand pain)      HPI  Ninfa Philip is a right hand dominant 68 y.o. female presenting today for follow up of left thumb and wrist pain.  Since her last visit she has been using the thumb spica brace regularly.  She also has been on vacation relaxing the whole time and noticed significant improvement in her pain.  Over the past week since she has been back from vacation she has noticed increased pain in the radial aspect of the left wrist.  She is unaware of any significant increase in activity, but states that she has been doing normal housework.  She did not start occupational therapy, due to her travel schedule.  She also has not tried the compound cream.  She reports for the past 3 days she has noticed some pain in the posterior aspect of the left elbow as well.  Again unaware of any new trauma.    There was not a history of trauma.  Onset of symptoms began approximately 3.5 months ago.  She denies any repetitive activity or specific inciting event. She was evaluated in urgent care on 06/08/2019, also evaluated by her primary care the week prior.        Review of patient's allergies indicates:   Allergen Reactions    Codeine      Not sure of reaction    Colchicine analogues Nausea And Vomiting    Doxycycline      Not sure of reaction    Equagesic [meprobamate-aspirin]      hyperactivity    Indocin [indomethacin sodium]      Not sure of reaction    Penicillins      Not sure of reaction    Tramadol Nausea And Vomiting    Vicodin [hydrocodone-acetaminophen]      Not sure of reaction         Current Outpatient Medications   Medication Sig Dispense Refill    allopurinol (ZYLOPRIM) 300 MG tablet Take 1 tablet (300 mg total) by mouth once daily. 90 tablet 3    bumetanide (BUMEX) 0.5 MG Tab  "TAKE 1 TABLET BY MOUTH EVERY MORNING AS NEEDED FOR SWELLING 30 tablet 1    carvedilol (COREG) 6.25 MG tablet Take 6.25 mg by mouth 2 (two) times daily.      fluticasone propionate (FLONASE) 50 mcg/actuation nasal spray 1 spray by Each Nare route once daily.      glipiZIDE (GLUCOTROL) 2.5 MG TR24 Take 2.5 mg by mouth daily with breakfast.       lisinopril (PRINIVIL,ZESTRIL) 40 MG tablet Take 40 mg by mouth once daily.      meloxicam (MOBIC) 7.5 MG tablet Take 1 tablet (7.5 mg total) by mouth once daily. 28 tablet 0    pantoprazole (PROTONIX) 40 MG tablet Take 1 tablet (40 mg total) by mouth 2 (two) times daily. 60 tablet 3    pravastatin (PRAVACHOL) 40 MG tablet Take 40 mg by mouth once daily.       No current facility-administered medications for this visit.        Past Medical History:   Diagnosis Date    Diabetes mellitus     Diabetes mellitus type I     Heart attack     2001, 2006    High cholesterol     Hypertension        Past Surgical History:   Procedure Laterality Date    BREAST SURGERY      ESOPHAGOGASTRODUODENOSCOPY (EGD) Left 10/30/2017    Performed by [facility] at [facility]    HYSTERECTOMY      THYROID CYST EXCISION           Review of Systems:  Review of Systems   Constitution: Negative for chills and fever.   Skin: Negative for rash and suspicious lesions.   Musculoskeletal:        See HPI   Neurological: Negative for dizziness, headaches, light-headedness, numbness and paresthesias.   Psychiatric/Behavioral: Negative for depression. The patient is not nervous/anxious.          OBJECTIVE:     PHYSICAL EXAM:  Height: 5' 6" (167.6 cm) Weight: 65.3 kg (144 lb)  Vitals:    08/22/19 1124   BP: 136/71   Pulse: (!) 51   Weight: 65.3 kg (144 lb)   Height: 5' 6" (1.676 m)     General    Vitals reviewed.  Constitutional: She is oriented to person, place, and time. She appears well-developed and well-nourished.   HENT:   Head: Normocephalic and atraumatic.   Neck: Normal range of motion. "   Cardiovascular: Normal rate.    Pulmonary/Chest: Effort normal. No respiratory distress.   Neurological: She is alert and oriented to person, place, and time.   Psychiatric: She has a normal mood and affect. Her behavior is normal. Judgment and thought content normal.             Musculoskeletal:  No scars or edema appreciated.  She is tender to palpation over the left radial styloid and CMC joint. Otherwise nontender.  Significant pain with thumb and wrist motion. Positive Finkelstein's on the left. Negative grind bilaterally, right CMC is nontender to palpation.  Neurovascularly intact-good sensation and motor function, good capillary refill, 2+ radial pulses.    RADIOGRAPHS:  Left hand x-ray, 6/13/19  FINDINGS:  There is no acute fracture, dislocation, or bone destruction.  No erosions or soft tissue calcifications are seen.  There is mild interphalangeal joint space narrowing consistent with early osteoarthritis.      Impression     Mild osteoarthritis.     Comments: I have personally reviewed the imaging and I agree with the above radiologist's report.    ASSESSMENT/PLAN:   Ninfa was seen today for follow-up and pain.    Diagnoses and all orders for this visit:    De Quervain's disease (radial styloid tenosynovitis)  -     Ambulatory Referral to Physical/Occupational Therapy    Arthritis of carpometacarpal (CMC) joint of left thumb  -     Ambulatory Referral to Physical/Occupational Therapy    Left elbow pain  -     Ambulatory Referral to Physical/Occupational Therapy           - We talked at length about the anatomy and pathophysiology of   Encounter Diagnoses   Name Primary?    De Quervain's disease (radial styloid tenosynovitis) Yes    Arthritis of carpometacarpal (CMC) joint of left thumb     Left elbow pain        - discussed patient's symptoms and physical exam findings, concern for de Quervain tenosynovitis and left CMC arthritis. Patient information provided.   - Thumb spica brace - continue  regular use  - discussed conservative and surgical treatment options.    - Start use of Compound cream   - New orders for OT  - follow-up in 6-8 weeks for re-evaluation  - call with questions or concerns    Disclaimer: This note has been generated using voice-recognition software. There may be typographical errors that have been missed during proof-reading.

## 2019-08-22 NOTE — LETTER
August 22, 2019      Barry Beauchamp MD  [address]  [place] [address]           [facility] [place], [facility] by [facility] [address] [facility] [address]  [address], [address]  [facility] [address]  Phone: [phone]          Patient: Ninfa Philip   MR Number: [id number]   YOB: 1950   Date of Visit: 8/22/2019       Dear Dr. Barry Beauchamp:    Thank you for referring Ninfa Philip to me for evaluation. Attached you will find relevant portions of my assessment and plan of care.    If you have questions, please do not hesitate to call me. I look forward to following Ninfa Philip along with you.    Sincerely,    YAMILEX Mcgill    Enclosure  CC:  No Recipients    If you would like to receive this communication electronically, please contact [email or web address] or [phone] to request more information on [employer] access.    For providers and/or their staff who would like to refer a patient to [facility], please contact us through our one-stop-shop provider referral line, [facility] , at [phone].    If you feel you have received this communication in error or would no longer like to receive these types of communications, please e-mail [email or web address]

## 2019-09-13 ENCOUNTER — CLINICAL SUPPORT (OUTPATIENT)
Dept: REHABILITATION | Facility: HOSPITAL | Age: 69
End: 2019-09-13
Payer: MEDICARE

## 2019-09-13 DIAGNOSIS — M25.532 LEFT WRIST PAIN: ICD-10-CM

## 2019-09-13 DIAGNOSIS — M25.522 LEFT ELBOW PAIN: ICD-10-CM

## 2019-09-13 PROCEDURE — G8987 SELF CARE CURRENT STATUS: HCPCS | Mod: CK

## 2019-09-13 PROCEDURE — 97165 OT EVAL LOW COMPLEX 30 MIN: CPT

## 2019-09-13 PROCEDURE — G8988 SELF CARE GOAL STATUS: HCPCS | Mod: CJ

## 2019-09-13 PROCEDURE — 97110 THERAPEUTIC EXERCISES: CPT

## 2019-09-13 NOTE — PATIENT INSTRUCTIONS
[facility]  OCCUPATIONAL THERAPY  HOME EXERCISE PROGRAM     Complete the following exercises with 10 repetitions each, 2 x/day.                                       hold 30 seconds, repeat 3 times. Stretch should be gentle and painfree. If needed, complete with elbow bent, progressing to elbow straight as tolerable         Complete the following exercises with 10 repetitions each, 2x/day.     AROM: Elbow Flexion / Extension          Bend and straighten elbow in 3 different positions: thumb up, palm up, palm down.        Copyright © I. All rights reserved.       DIANNE Baron  Occupational Therapist

## 2019-09-13 NOTE — PLAN OF CARE
[facility] Occupational Therapy  Initial Evaluation     Date: 9/13/2019  Name: Ninfa Philip  Clinic Number: [id number]    Therapy Diagnosis:   Encounter Diagnoses   Name Primary?    Left wrist pain     Left elbow pain      Physician: Lisa Smith PA    Physician Orders: eval and treat  Medical Diagnosis: Left De Quervain's tenosynovitis, and L elbow pain  Surgical Procedure and Date: n/a  Evaluation Date: 9/13/19  Insurance Authorization Period Expiration: 8/21/2020  Plan of Care Certification Period: 11/8/19  Date of Return to MD: 10/17/19    Visit # / Visits authorized: 1 / 1, further visits pending    FOTO: initial eval       Time In: 10:10 am  Time Out: 11:00 am  Total treatment time: 50 minutes  Total Timed minutes: 10 minutes    Precautions:  Standard    Subjective     Involved Side: left  Dominant Side: Right  Date of Onset: ~4 mo ago  Mechanism of Injury: insidious   History of Current Condition: Pt has been wearing wrist brace since PA visit in June. She has not had surgery. Pt cont to be limited with wrist and arm during ADLs/IADLs.  Imaging: x-ray on 6/13/19 indicates: There is no acute fracture, dislocation, or bone destruction.  No erosions or soft tissue calcifications are seen.  There is mild interphalangeal joint space narrowing consistent with early osteoarthritis.  Previous Therapy: no prior therapy    Past Medical History/Physical Systems Review:   Ninfa Philip  has a past medical history of Diabetes mellitus, Diabetes mellitus type I, Heart attack, High cholesterol, and Hypertension.    Ninfa Philip  has a past surgical history that includes Hysterectomy; Thyroid cyst excision; and Breast surgery.    Ninfa has a current medication list which includes the following prescription(s): allopurinol, bumetanide, carvedilol, fluticasone propionate, glipizide, lisinopril, meloxicam, pantoprazole, and pravastatin.    Review of patient's allergies indicates:   Allergen  "Reactions    Codeine      Not sure of reaction    Colchicine analogues Nausea And Vomiting    Doxycycline      Not sure of reaction    Equagesic [meprobamate-aspirin]      hyperactivity    Indocin [indomethacin sodium]      Not sure of reaction    Penicillins      Not sure of reaction    Tramadol Nausea And Vomiting    Vicodin [hydrocodone-acetaminophen]      Not sure of reaction        Patient's Goals for Therapy: to have less pain with activities    Pt states "this brace helps. I feel like the thumb is more stabilized. It's much better." Pt reports it feels like her tendons in the thumb are "catching"    Pain:  Functional Pain Scale Rating 0-10:   0/10 on average  0/10 at best  2/10 at worst  Location: L radial wrist and thumb, also reports mild tenderness to L posterior elbow  Description: Tight and tenderness  Aggravating Factors: Flexing and Lifting  Easing Factors: rest and wearing brace    Occupation:    Working presently: retired teacher  Duties: n/a    Functional Limitations/Social History:    Previous functional status includes: Independent with all ADLs.     Current FunctionalStatus   Home/Living environment : lives alone      Limitation of Functional Status as follows:   ADLs/IADLs:     - Feeding: I    - Bathing: I    - Dressing/Grooming: pain with pulling up pants    - Driving: n/a    -lifting and moving pots, lifting and carrying other objects     Leisure: pt enjoys walking, denies limitations        Objective     Observation/Appearance: wearing prefabricated thumb spica brace. No edema noted.    Elbow and Wrist ROM. Measured in degrees.   9/13/2019 9/13/2019    Left Right   Elbow Ext/Flex WNL WNL   Supination/Pronation WNL WNL   Wrist Ext/Flex 70/80 with pain with ext 60/78   Wrist RD/UD 12/40 with pain 16/44     Hand ROM. Measured in degrees.  Other digits WNL   9/13/2019 9/13/2019    Left Right   Thumb: MP 50 WNL                IP 10 WNL       Rad ADD/ABD 42 WNL       Pal ADD/ABD 48 WNL      "     Opposition WNL WNL      Strength (Dynamometer) and Pinch Strength (Pinch Gauge)  Measured in pounds.   9/13/2019 9/13/2019    Left Right   Rung II 30 42   Key Pinch 4 10   3pt Pinch 6 9   2pt Pinch 3 6     Sensation: denies numbness and tingling, light touch intact    Manual Muscle Test   9/13/2019 9/13/2019    Left Right   Wrist Extension  5/5 5/5   Wrist Flexion 5/5 with pain 5/5   Radial Deviation 5/5 with pain 5/5   Ulnar Deviation 4/5 5/5   Supination 5/5 with pain 5/5   Pronation 5/5 5/5   Elbow Extension 5/5 with pain 5/5   Elbow Flexion 5/5 5/5     Special Tests  Finkelstein's Test + on L       CMS Impairment/Limitation/Restriction for FOTO Wrist Survey    Therapist reviewed FOTO scores for Ninfa Philip on 9/13/2019.   FOTO documents entered into [employer] - see Media section.    Limitation Score: 45%  Category: Self Care    Current : CK = at least 40% but < 60% impaired, limited or restricted  Goal: CJ = at least 20% but < 40% impaired, limited or restricted  Discharge: tba         Treatment     Treatment Time In: 10:50 am  Treatment Time Out: 11:00 am  Total Treatment time separate from Evaluation time:10 min    Ninfa received the following supervised modalities after being cleared for contradictions for 0 minutes:   -attempted paraffin, but pt did not tolerate    Ninfa received therapeutic exercises for 10 minutes including:  -  Wrist AROM  Ext/ Flx  RD/UD Pain free x 10 reps each   AROM thumb:  IP blocks  Radial abd/add  Palm abd/add  Circles (CW/CCW)  Palm abd/add  Opposition       Pain free X 10 reps each    Finkelstein's stretch painfree X 30 sec hold (not today due to pain)       Home Exercise Program/Education:  Issued HEP (see patient instructions in EMR) and educated on modality use for pain management . Exercises were reviewed and Ninfa was able to demonstrate them prior to the end of the session.   Pt received a written copy of exercises to perform at home. Ninfa demonstrated good   understanding of the education provided.  Pt was advised to perform these exercises free of pain, and to stop performing them if pain occurs.    Patient/Family Education: role of OT, goals for OT, scheduling/cancellations - pt verbalized understanding. Discussed insurance limitations with patient.    Additional Education provided: reviewed modalities, brace wear    Assessment     Ninfa Philip is a 69 y.o. female referred to outpatient occupational therapy and presents with a medical diagnosis of L elbow pain and L De Quervain's tenosynovitis, resulting in pain, stiffness, weakness, and limited functional use of hand, and demonstrates limitations as described in the chart below. Following medical record review it is determined that pt will benefit from occupational therapy services in order to maximize pain free and/or functional use of left hand. The following goals were discussed with the patient and patient is in agreement with them as to be addressed in the treatment plan. The patient's rehab potential is Excellent.     Anticipated barriers to occupational therapy: none  Pt has no cultural, educational or language barriers to learning provided.    Profile and History Assessment of Occupational Performance Level of Clinical Decision Making Complexity Score   Occupational Profile:   Ninfa Philip is a 69 y.o. female who lives alone and is currently retired. Ninfa Philip has difficulty with  grooming  phone/computer use, housework/household chores and cooking  affecting his/her daily functional abilities. His/her main goal for therapy is to have less pain.     Comorbidities:   none    Medical and Therapy History Review:   Brief               Performance Deficits    Physical:  Joint Stability  Muscle Power/Strength  Muscle Endurance   Strength  Pinch Strength  Pain    Cognitive:  No Deficits    Psychosocial:    No Deficits     Clinical Decision Making:  moderate    Assessment Process:  Detailed  Assessments    Modification/Need for Assistance:  Minimal-Moderate Modifications/Assistance    Intervention Selection:  Several Treatment Options       low  Based on PMHX, co morbidities , data from assessments and functional level of assistance required with task and clinical presentation directly impacting function.         Goals:   The following goals were discussed with the patient and patient is in agreement with them as to be addressed in the treatment plan.   Long Term Goals (LTGs); to be met by discharge.  LTG #1: Pt will report a pain level of 0 out of 10 with holding things and with cooking   LTG #2: Pt will demo improved FOTO score by at least 15 points.   LTG #3: Pt will return to prior level of function for ADLs and household management.   LTG #4: Pt will demonstrate improved L pinch strength by at least 2-4 psi for functional tasks    Short Term Goals (STGs); to be met within 4 weeks (10/13/19).  STG #1a: Pt will report 2 out of 10 pain level with ADLs and house hold tasks.  STG #2: Pt will demonstrate independence with issued HEP.   STG #3b: Pt will demo improved L thumb [place] by at least 10 degrees needed to aid with more functional grasp of objects.        Plan   Certification Period/Plan of care expiration: 9/13/2019 to 11/8/19.    Outpatient Occupational Therapy 1 times weekly for 8 weeks to include the following interventions: Paraffin, Fluidotherapy, Manual therapy/joint mobilizations, Modalities for pain management, US 3 mhz, Therapeutic exercises/activities., Strengthening, Joint Protection and Energy Conservation.      DIANNE Baron  Date:

## 2019-09-20 ENCOUNTER — CLINICAL SUPPORT (OUTPATIENT)
Dept: REHABILITATION | Facility: HOSPITAL | Age: 69
End: 2019-09-20
Payer: MEDICARE

## 2019-09-20 DIAGNOSIS — M25.532 LEFT WRIST PAIN: ICD-10-CM

## 2019-09-20 DIAGNOSIS — M25.522 LEFT ELBOW PAIN: ICD-10-CM

## 2019-09-20 PROCEDURE — 97110 THERAPEUTIC EXERCISES: CPT

## 2019-09-20 PROCEDURE — 97140 MANUAL THERAPY 1/> REGIONS: CPT

## 2019-09-20 PROCEDURE — 97018 PARAFFIN BATH THERAPY: CPT

## 2019-09-20 NOTE — PROGRESS NOTES
Occupational Therapy Daily Treatment Note     Date: 9/20/2019  Name: Ninfa Philip  Clinic Number: [id number]    Therapy Diagnosis:   Encounter Diagnoses   Name Primary?    Left wrist pain     Left elbow pain      Physician: Lisa Smith PA    Physician Orders: eval and treat  Medical Diagnosis: Left De Quervain's tenosynovitis, and L elbow pain  Surgical Procedure and Date: n/a  Evaluation Date: 9/13/19  Insurance Authorization Period Expiration: 8/21/2020  Plan of Care Certification Period: 11/8/19  Date of Return to MD: 10/17/19    Visit # / Visits authorized: 1 / 19  Time In:11:00 am  Time Out: 12:00 pm  Total Billable Time: 40 minutes    Precautions:  Standard      Subjective     Pt reports: no problems with initial HEP  she was compliant with home exercise program given last session.   Response to previous treatment: no adverse reactions reported  Functional change: None reported at first treatment after initial evaluation     Pain: 5/10  Location: left elbow/wrist/thumb      Objective     Ninfa received the following supervised modalities after being cleared for contradictions for 10 minutes:   -Paraffin w/ MHP to L wrist/hand, pre-tx to decrease pain & increase tissue extensibility    Ninfa received the following manual therapy techniques for 10 minutes:   -Edema mgmt w/ lymphatic drainage technique;  Deep STM, including MFR & CFM  to L elbow/forearm/wrist/hand, using hand techniques and IASTM tools to increase blood flow/circulation, improve soft tissue pliability and decrease pain.  -Kinesiotaping: EPL inhibition taping pattern applied to T forearm/wrist/thumb for space correction and soft tissue mgmt. Patient instructed on purpose, wear, care, precautions to monitor and removal of KT. Patient verbalized understanding of all instructions provided.      Ninfa received therapeutic exercises for 20 minutes direct care and 20 minutes of supervised care including::  AROM     -elbow 3 ways 10  reps each   0Forearm sup/pron at side 10 reps    -wrist E/F, RD/UD 10 reps each   -Thumb Rad/Palm ABD/ADD 10 reps each   -Thumb/pinky slides 10 reps each   -Isolated thumb IP E/F 10 reps        Forearm stretches-3 positions  3 reps each, elbow at side & straight        Dexterciser 3 min        T-Putty  yellow   -molding 2 min   -log rolls 2 trials   -gentle squeezes 3 reps           Home Exercises and Education Provided     Education provided:   - Reviewed initial HEP  - Progress towards goals     Written Home Exercises Provided: Patient instructed to cont prior HEP.  Exercises were reviewed and Ninfa was able to demonstrate them prior to the end of the session.  Ninfa demonstrated good  understanding of the HEP provided.   .   See EMR under Patient Instructions for exercises provided prior visit.        Assessment     Patient tolerated treatment well today without an increase in pain.  She was noted to have a significant amount of myofascial tightness in her L forearm muscle, but this did improve after MT and forearm stretching ex today.      Ninfa is progressing well towards her goals and there are no updates to goals at this time. Pt prognosis is Good.     Pt will continue to benefit from skilled outpatient occupational therapy to address the deficits listed in the problem list on initial evaluation provide pt/family education and to maximize pt's level of independence in the home and community environment.     Anticipated barriers to occupational therapy: None    Pt's spiritual, cultural and educational needs considered and pt agreeable to plan of care and goals.    Goals:  The following goals were discussed with the patient and patient is in agreement with them as to be addressed in the treatment plan.   Long Term Goals (LTGs); to be met by discharge.  LTG #1: Pt will report a pain level of 0 out of 10 with holding things and with cooking          LTG #2: Pt will demo improved FOTO score by at least 15  points.   LTG #3: Pt will return to prior level of function for ADLs and household management.   LTG #4: Pt will demonstrate improved L pinch strength by at least 2-4 psi for functional tasks     Short Term Goals (STGs); to be met within 4 weeks (10/13/19).  STG #1a: Pt will report 2 out of 10 pain level with ADLs and house hold tasks.  STG #2: Pt will demonstrate independence with issued HEP.   STG #3b: Pt will demo improved L thumb [place] by at least 10 degrees needed to aid with more functional grasp of objects.      Plan     Certification Period/Plan of care expiration: 9/13/2019 to 11/8/19.     Outpatient Occupational Therapy 1 times weekly for 8 weeks to include the following interventions: Paraffin, Fluidotherapy, Manual therapy/joint mobilizations, Modalities for pain management, US 3 mhz, Therapeutic exercises/activities., Strengthening, Joint Protection and Energy Conservation    Updates/Grading for next session: progress light strengthening, as tolerated      Beto Kauffman, OT

## 2019-09-26 ENCOUNTER — CLINICAL SUPPORT (OUTPATIENT)
Dept: REHABILITATION | Facility: HOSPITAL | Age: 69
End: 2019-09-26
Payer: MEDICARE

## 2019-09-26 DIAGNOSIS — M25.532 LEFT WRIST PAIN: ICD-10-CM

## 2019-09-26 DIAGNOSIS — M25.522 LEFT ELBOW PAIN: ICD-10-CM

## 2019-09-26 PROCEDURE — 97140 MANUAL THERAPY 1/> REGIONS: CPT

## 2019-09-26 PROCEDURE — 97110 THERAPEUTIC EXERCISES: CPT

## 2019-09-26 PROCEDURE — 97035 APP MDLTY 1+ULTRASOUND EA 15: CPT

## 2019-09-26 PROCEDURE — 97018 PARAFFIN BATH THERAPY: CPT | Mod: 59

## 2019-09-26 NOTE — PROGRESS NOTES
"  Occupational Therapy Daily Treatment Note     Date: 9/26/2019  Name: Ninfa Philip  Clinic Number: [id number]    Therapy Diagnosis:   Encounter Diagnoses   Name Primary?    Left wrist pain     Left elbow pain      Physician: Lisa Smith PA    Physician Orders: eval and treat  Medical Diagnosis: Left De Quervain's tenosynovitis, and L elbow pain  Surgical Procedure and Date: n/a  Evaluation Date: 9/13/19  Insurance Authorization Period Expiration: 8/21/2020  Plan of Care Certification Period: 11/8/19  Date of Return to MD: 10/17/19    Visit # / Visits authorized:  3 / 20  Time In:1015 am   Time Out: 11 am   Total Billable Time: 45 minutes    Precautions:  Standard      Subjective     Pt reports: "the elbow is doing ok, the wrist is still painful"   she was compliant with home exercise program given last session.   Response to previous treatment: no adverse reactions reported  Functional change: None reported at first treatment after initial evaluation     Pain: 5/10  Location: left elbow/wrist/thumb      Objective     Ninfa received the following supervised modalities after being cleared for contradictions for 10 minutes:   -Paraffin  to L wrist/hand, pre-tx to decrease pain & increase tissue extensibility, blood flow, and circulation     Ninfa received the following manual therapy techniques for 15 minutes:   -Edema mgmt w/ lymphatic drainage technique;  Deep STM, including MFR & CFM  to L elbow/forearm/wrist/hand, using hand techniques and IASTM tools to increase blood flow/circulation, improve soft tissue pliability and decrease pain.  -Kinesiotaping: EPL inhibition taping pattern applied to T forearm/wrist/thumb for space correction and soft tissue mgmt. Patient instructed on purpose, wear, care, precautions to monitor and removal of KT. Patient verbalized understanding of all instructions provided.      Ninfa received therapeutic exercises for 25 minutes   AROM     -elbow 3 ways 10 reps each "   Forearm sup/pron at side 10 reps    -wrist E/F, RD/UD 10 reps each   -Thumb Rad/Palm ABD/ADD 10 reps each   -Thumb/pinky slides 10 reps each   -Isolated thumb IP E/F 10 reps        Forearm stretches-3 positions  3 reps each, elbow at side & straight        Varigrip SAM  Red 50% x 10 for thumb press and EPL         T-Putty  yellow   -sustained   10 reps    -log rolls 2 and 3 pt pinch x 10 reps each    -biscuit - key pinch  Key pinch 10 reps    EPL/ APB donut  Extensor strengthening x 10 each        Home Exercises and Education Provided     Education provided:   - Reviewed initial HEP  - Progress towards goals     Written Home Exercises Provided: Patient instructed to cont prior HEP.  Exercises were reviewed and Ninfa was able to demonstrate them prior to the end of the session.  Ninfa demonstrated good  understanding of the HEP provided.   .   See EMR under Patient Instructions for exercises provided prior visit.        Assessment     Patient tolerated treatment well today without an increase in pain.  Patient tolerated increase in resistance this day without complaint.  KT tape assisting with pain management.  Will progress as tolerated.   Ninfa is progressing well towards her goals and there are no updates to goals at this time. Pt prognosis is Good.     Pt will continue to benefit from skilled outpatient occupational therapy to address the deficits listed in the problem list on initial evaluation provide pt/family education and to maximize pt's level of independence in the home and community environment.     Anticipated barriers to occupational therapy: None    Pt's spiritual, cultural and educational needs considered and pt agreeable to plan of care and goals.    Goals:  The following goals were discussed with the patient and patient is in agreement with them as to be addressed in the treatment plan.   Long Term Goals (LTGs); to be met by discharge.  LTG #1: Pt will report a pain level of 0 out of 10  with holding things and with cooking          LTG #2: Pt will demo improved FOTO score by at least 15 points.   LTG #3: Pt will return to prior level of function for ADLs and household management.   LTG #4: Pt will demonstrate improved L pinch strength by at least 2-4 psi for functional tasks     Short Term Goals (STGs); to be met within 4 weeks (10/13/19).  STG #1a: Pt will report 2 out of 10 pain level with ADLs and house hold tasks.  STG #2: Pt will demonstrate independence with issued HEP.   STG #3b: Pt will demo improved L thumb [place] by at least 10 degrees needed to aid with more functional grasp of objects.      Plan     Certification Period/Plan of care expiration: 9/13/2019 to 11/8/19.     Outpatient Occupational Therapy 1 times weekly for 8 weeks to include the following interventions: Paraffin, Fluidotherapy, Manual therapy/joint mobilizations, Modalities for pain management, US 3 mhz, Therapeutic exercises/activities., Strengthening, Joint Protection and Energy Conservation    Updates/Grading for next session: progress light strengthening, as tolerated      Lola Combs, OT , CHT

## 2019-09-27 LAB
LEFT EYE DM RETINOPATHY: POSITIVE
RIGHT EYE DM RETINOPATHY: POSITIVE

## 2019-10-02 ENCOUNTER — CLINICAL SUPPORT (OUTPATIENT)
Dept: REHABILITATION | Facility: HOSPITAL | Age: 69
End: 2019-10-02
Payer: MEDICARE

## 2019-10-02 DIAGNOSIS — M25.532 LEFT WRIST PAIN: ICD-10-CM

## 2019-10-02 DIAGNOSIS — M25.522 LEFT ELBOW PAIN: ICD-10-CM

## 2019-10-02 PROCEDURE — 97110 THERAPEUTIC EXERCISES: CPT

## 2019-10-02 PROCEDURE — 97018 PARAFFIN BATH THERAPY: CPT | Mod: 59

## 2019-10-02 PROCEDURE — 97140 MANUAL THERAPY 1/> REGIONS: CPT

## 2019-10-02 NOTE — PROGRESS NOTES
"  Occupational Therapy Daily Treatment Note     Date: 10/2/2019  Name: Ninfa Philip  Clinic Number: [id number]    Medical Diagnosis: Left De Quervain's tenosynovitis, and L elbow pain    Therapy Diagnosis:   Encounter Diagnoses   Name Primary?    Left wrist pain     Left elbow pain      Physician: Lisa Smith PA    Physician Orders: eval and treat  Medical Diagnosis: Left De Quervain's tenosynovitis, and L elbow pain  Surgical Procedure and Date: n/a  Evaluation Date: 9/13/19  Insurance Authorization Period Expiration: 8/21/2020  Plan of Care Certification Period: 11/8/19  Date of Return to MD: 10/17/19    Visit # / Visits authorized: 4 / 19  Time In:10:15 AM   Time Out: 11 AM   Total Billable Time: 45 minutes    Precautions:  Standard    Subjective     Pt reports: "I haven't had any pain since I left here last week except yesterday the thumb started bothering me.  I see the doctor next week".    she was compliant with home exercise program given last session.   Response to previous treatment: no adverse reactions reported  Functional change: None reported at first treatment after initial evaluation     Pain: 0/10; Yesterday 2 out of ten in thumb.   Location: left elbow/wrist/thumb      Objective     Ninfa received the following supervised modalities after being cleared for contradictions for 10 minutes:   -Paraffin  X 10 min by pouring into bag and placing over hand. to L wrist/hand, pre-tx to decrease pain & increase tissue extensibility    Ninfa received the following manual therapy techniques for 10 minutes:   -Edema mgmt w/ lymphatic drainage technique;  Deep STM, including MFR & CFM  to L elbow/forearm/wrist/hand, using hand techniques and IASTM tools to increase blood flow/circulation, improve soft tissue pliability and decrease pain.  -Kinesiotaping: EPL inhibition taping pattern applied to T forearm/wrist/thumb for space correction and soft tissue mgmt. Patient instructed on purpose, wear, " care, precautions to monitor and removal of KT. Patient verbalized understanding of all instructions provided.      Ninfa received therapeutic exercises for 25 minutes direct care including::  AROM     -elbow 3 ways 10 reps each   0Forearm sup/pron at side 10 reps    -wrist E/F, RD/UD 10 reps each   -Thumb Rad/Palm ABD/ADD 10 reps each   -Thumb/pinky slides 10 reps each   -Isolated thumb IP E/F 10 reps        Forearm stretches-3 positions  3 reps each, elbow at side & straight        Dexterciser 3 min        T-Putty  yellow   -molding 2 min   -log rolls 2 trials   -gentle squeezes 3 reps   Thumb extension /ab     4# clothespin with pom pom  X 10 reps     X 30        Home Exercises and Education Provided     Education provided:   - Reviewed initial HEP  - Progress towards goals     Written Home Exercises Provided: Patient instructed to cont prior HEP.  Exercises were reviewed and Ninfa was able to demonstrate them prior to the end of the session.  Ninfa demonstrated good  understanding of the HEP provided.   .   See EMR under Patient Instructions for exercises provided prior visit.     Assessment     Patient tolerated treatment well today without an increase in pain.  Slight discomfort with TFM to thumb EPL; Tolerated theraputty well, provided putty for HEP.       Ninfa is progressing well towards her goals and there are no updates to goals at this time. Pt prognosis is Good.     Pt will continue to benefit from skilled outpatient occupational therapy to address the deficits listed in the problem list on initial evaluation provide pt/family education and to maximize pt's level of independence in the home and community environment.     Anticipated barriers to occupational therapy: None    Pt's spiritual, cultural and educational needs considered and pt agreeable to plan of care and goals.    Goals:  The following goals were discussed with the patient and patient is in agreement with them as to be addressed in  the treatment plan.   Long Term Goals (LTGs); to be met by discharge.  LTG #1: Pt will report a pain level of 0 out of 10 with holding things and with cooking          LTG #2: Pt will demo improved FOTO score by at least 15 points.   LTG #3: Pt will return to prior level of function for ADLs and household management.   LTG #4: Pt will demonstrate improved L pinch strength by at least 2-4 psi for functional tasks     Short Term Goals (STGs); to be met within 4 weeks (10/13/19).  STG #1a: Pt will report 2 out of 10 pain level with ADLs and house hold tasks.  STG #2: Pt will demonstrate independence with issued HEP.   STG #3b: Pt will demo improved L thumb [place] by at least 10 degrees needed to aid with more functional grasp of objects.      Plan     Certification Period/Plan of care expiration: 9/13/2019 to 11/8/19.     Outpatient Occupational Therapy 1 times weekly for 8 weeks to include the following interventions: Paraffin, Fluidotherapy, Manual therapy/joint mobilizations, Modalities for pain management, US 3 mhz, Therapeutic exercises/activities., Strengthening, Joint Protection and Energy Conservation    Updates/Grading for next session: progress light strengthening, as tolerated      Lola Combs, OT, CHT

## 2019-10-07 ENCOUNTER — TELEPHONE (OUTPATIENT)
Dept: ORTHOPEDICS | Facility: CLINIC | Age: 69
End: 2019-10-07

## 2019-10-07 NOTE — TELEPHONE ENCOUNTER
Lm on vm advising pt to call office back to reschedule appt. 10/17/19 due to Junie will be out the office.

## 2019-10-08 ENCOUNTER — TELEPHONE (OUTPATIENT)
Dept: ORTHOPEDICS | Facility: CLINIC | Age: 69
End: 2019-10-08

## 2019-10-21 ENCOUNTER — TELEPHONE (OUTPATIENT)
Dept: ORTHOPEDICS | Facility: CLINIC | Age: 69
End: 2019-10-21

## 2019-11-13 ENCOUNTER — TELEPHONE (OUTPATIENT)
Dept: ORTHOPEDICS | Facility: CLINIC | Age: 69
End: 2019-11-13

## 2019-12-05 ENCOUNTER — TELEPHONE (OUTPATIENT)
Dept: ORTHOPEDICS | Facility: CLINIC | Age: 69
End: 2019-12-05

## 2019-12-06 ENCOUNTER — OFFICE VISIT (OUTPATIENT)
Dept: ORTHOPEDICS | Facility: CLINIC | Age: 69
End: 2019-12-06
Payer: MEDICARE

## 2019-12-06 VITALS
HEIGHT: 66 IN | SYSTOLIC BLOOD PRESSURE: 134 MMHG | BODY MASS INDEX: 23.13 KG/M2 | WEIGHT: 143.94 LBS | DIASTOLIC BLOOD PRESSURE: 79 MMHG | HEART RATE: 61 BPM | TEMPERATURE: 99 F

## 2019-12-06 DIAGNOSIS — M65.4 DE QUERVAIN'S DISEASE (RADIAL STYLOID TENOSYNOVITIS): Primary | ICD-10-CM

## 2019-12-06 DIAGNOSIS — M18.12 ARTHRITIS OF CARPOMETACARPAL (CMC) JOINT OF LEFT THUMB: ICD-10-CM

## 2019-12-06 PROBLEM — M25.532 LEFT WRIST PAIN: Status: RESOLVED | Noted: 2019-09-13 | Resolved: 2019-12-06

## 2019-12-06 PROBLEM — M25.522 LEFT ELBOW PAIN: Status: RESOLVED | Noted: 2019-09-13 | Resolved: 2019-12-06

## 2019-12-06 PROCEDURE — 99213 OFFICE O/P EST LOW 20 MIN: CPT | Mod: S$PBB,,, | Performed by: PHYSICIAN ASSISTANT

## 2019-12-06 PROCEDURE — 99213 PR OFFICE/OUTPT VISIT, EST, LEVL III, 20-29 MIN: ICD-10-PCS | Mod: S$PBB,,, | Performed by: PHYSICIAN ASSISTANT

## 2019-12-06 PROCEDURE — 1126F AMNT PAIN NOTED NONE PRSNT: CPT | Mod: ,,, | Performed by: PHYSICIAN ASSISTANT

## 2019-12-06 PROCEDURE — 99999 PR PBB SHADOW E&M-EST. PATIENT-LVL IV: ICD-10-PCS | Mod: PBBFAC,,, | Performed by: PHYSICIAN ASSISTANT

## 2019-12-06 PROCEDURE — 1159F MED LIST DOCD IN RCRD: CPT | Mod: ,,, | Performed by: PHYSICIAN ASSISTANT

## 2019-12-06 PROCEDURE — 1126F PR PAIN SEVERITY QUANTIFIED, NO PAIN PRESENT: ICD-10-PCS | Mod: ,,, | Performed by: PHYSICIAN ASSISTANT

## 2019-12-06 PROCEDURE — 99214 OFFICE O/P EST MOD 30 MIN: CPT | Mod: PBBFAC | Performed by: PHYSICIAN ASSISTANT

## 2019-12-06 PROCEDURE — 99999 PR PBB SHADOW E&M-EST. PATIENT-LVL IV: CPT | Mod: PBBFAC,,, | Performed by: PHYSICIAN ASSISTANT

## 2019-12-06 PROCEDURE — 1159F PR MEDICATION LIST DOCUMENTED IN MEDICAL RECORD: ICD-10-PCS | Mod: ,,, | Performed by: PHYSICIAN ASSISTANT

## 2019-12-06 NOTE — PROGRESS NOTES
Subjective:      Patient ID: Ninfa Philip is a 69 y.o. female.    Chief Complaint: Pain of the Left Hand      HPI  Ninfa Philip is a right hand dominant 69 y.o. female presenting today for follow up of left thumb and wrist pain. She was regularly attending occupational therapy, reports significant improvement in her pain and function. She feels comfortable with home exercises.  She reports that she has very rare pains in the radial left wrist and thumb.  She has discontinued use of the brace.  She denies any finger numbness or tingling.    There was not a history of trauma.  Onset of symptoms began 5/2019.  She denies any repetitive activity or specific inciting event. She was evaluated in urgent care on 06/08/2019, also evaluated by her primary care the week prior.        Review of patient's allergies indicates:   Allergen Reactions    Codeine      Not sure of reaction    Colchicine analogues Nausea And Vomiting    Doxycycline      Not sure of reaction    Equagesic [meprobamate-aspirin]      hyperactivity    Indocin [indomethacin sodium]      Not sure of reaction    Penicillins      Not sure of reaction    Tramadol Nausea And Vomiting    Vicodin [hydrocodone-acetaminophen]      Not sure of reaction         Current Outpatient Medications   Medication Sig Dispense Refill    allopurinol (ZYLOPRIM) 300 MG tablet Take 1 tablet (300 mg total) by mouth once daily. 90 tablet 3    bumetanide (BUMEX) 0.5 MG Tab TAKE 1 TABLET BY MOUTH EVERY MORNING AS NEEDED FOR SWELLING 30 tablet 1    carvedilol (COREG) 6.25 MG tablet Take 6.25 mg by mouth 2 (two) times daily.      fluticasone propionate (FLONASE) 50 mcg/actuation nasal spray 1 spray by Each Nare route once daily.      glipiZIDE (GLUCOTROL) 2.5 MG TR24 Take 2.5 mg by mouth daily with breakfast.       lisinopril (PRINIVIL,ZESTRIL) 40 MG tablet Take 40 mg by mouth once daily.      meloxicam (MOBIC) 7.5 MG tablet Take 1 tablet (7.5 mg total) by mouth  "once daily. 28 tablet 0    pravastatin (PRAVACHOL) 40 MG tablet Take 40 mg by mouth once daily.      pantoprazole (PROTONIX) 40 MG tablet Take 1 tablet (40 mg total) by mouth 2 (two) times daily. 60 tablet 3     No current facility-administered medications for this visit.        Past Medical History:   Diagnosis Date    Diabetes mellitus     Diabetes mellitus type I     Heart attack     2001, 2006    High cholesterol     Hypertension        Past Surgical History:   Procedure Laterality Date    BREAST SURGERY      HYSTERECTOMY      THYROID CYST EXCISION           Review of Systems:  Review of Systems   Constitution: Negative for chills and fever.   Skin: Negative for rash and suspicious lesions.   Musculoskeletal:        See HPI   Neurological: Negative for dizziness, headaches, light-headedness, numbness and paresthesias.   Psychiatric/Behavioral: Negative for depression. The patient is not nervous/anxious.          OBJECTIVE:     PHYSICAL EXAM:  Height: 5' 6" (167.6 cm) Weight: 65.3 kg (143 lb 15.4 oz)  Vitals:    12/06/19 1001   BP: 134/79   Pulse: 61   Temp: 98.9 °F (37.2 °C)   Weight: 65.3 kg (143 lb 15.4 oz)   Height: 5' 6" (1.676 m)   PainSc: 0-No pain     General    Vitals reviewed.  Constitutional: She is oriented to person, place, and time. She appears well-developed and well-nourished.   HENT:   Head: Normocephalic and atraumatic.   Neck: Normal range of motion.   Cardiovascular: Normal rate.    Pulmonary/Chest: Effort normal. No respiratory distress.   Neurological: She is alert and oriented to person, place, and time.   Psychiatric: She has a normal mood and affect. Her behavior is normal. Judgment and thought content normal.             Musculoskeletal:  No scars or edema appreciated.  She is nontender to palpation over the left radial styloid and CMC joint.  Good finger and wrist range of motion bilaterally. No pain with thumb and wrist motion. Negative Finkelstein's. Negative grind " bilaterally.  Neurovascularly intact-good sensation and motor function, good capillary refill, 2+ radial pulses.    RADIOGRAPHS:  Left hand x-ray, 6/13/19  FINDINGS:  There is no acute fracture, dislocation, or bone destruction.  No erosions or soft tissue calcifications are seen.  There is mild interphalangeal joint space narrowing consistent with early osteoarthritis.      Impression     Mild osteoarthritis.     Comments: I have personally reviewed the imaging and I agree with the above radiologist's report.    ASSESSMENT/PLAN:   Ninfa was seen today for pain.    Diagnoses and all orders for this visit:    De Quervain's disease (radial styloid tenosynovitis)    Arthritis of carpometacarpal (CMC) joint of left thumb           - We talked at length about the anatomy and pathophysiology of   Encounter Diagnoses   Name Primary?    De Quervain's disease (radial styloid tenosynovitis) Yes    Arthritis of carpometacarpal (CMC) joint of left thumb        - continue HEP as needed  - ice and NSAIDs as needed  - follow-up as needed  - call with questions or concerns    Disclaimer: This note has been generated using voice-recognition software. There may be typographical errors that have been missed during proof-reading.

## 2020-03-04 LAB — HCV AB SERPL QL IA: NEGATIVE

## 2020-03-30 ENCOUNTER — DOCUMENTATION ONLY (OUTPATIENT)
Dept: REHABILITATION | Facility: HOSPITAL | Age: 70
End: 2020-03-30

## 2020-03-30 NOTE — PROGRESS NOTES
Outpatient Therapy Discharge Summary     Name: Ninfa Philip  Clinic Number: [id number]    Medical Diagnosis: Left De Quervain's tenosynovitis, and L elbow pain     Therapy Diagnosis:        Encounter Diagnoses   Name Primary?    Left wrist pain      Left elbow pain        Physician: Lisa Smith PA     Physician Orders: eval and treat  Medical Diagnosis: Left De Quervain's tenosynovitis, and L elbow pain  Surgical Procedure and Date: n/a  Evaluation Date: 9/13/19    Date of Last visit: 10/2/2019  Total Visits Received: 4      Assessment      Pt did not return to therapy after visit 4.    Goals: not met    Discharge reason: Patient has not attended therapy since 10/2/2019    Plan   This patient is discharged from Occupational Therapy    DIANNE Baron

## 2020-08-01 ENCOUNTER — OFFICE VISIT (OUTPATIENT)
Dept: URGENT CARE | Facility: CLINIC | Age: 70
End: 2020-08-01
Payer: MEDICARE

## 2020-08-01 VITALS
HEART RATE: 59 BPM | WEIGHT: 143 LBS | BODY MASS INDEX: 23.08 KG/M2 | TEMPERATURE: 98 F | OXYGEN SATURATION: 98 % | RESPIRATION RATE: 16 BRPM | SYSTOLIC BLOOD PRESSURE: 104 MMHG | DIASTOLIC BLOOD PRESSURE: 60 MMHG

## 2020-08-01 DIAGNOSIS — R42 VERTIGO: Primary | ICD-10-CM

## 2020-08-01 PROCEDURE — 99214 OFFICE O/P EST MOD 30 MIN: CPT | Mod: S$GLB,,, | Performed by: NURSE PRACTITIONER

## 2020-08-01 PROCEDURE — 99214 PR OFFICE/OUTPT VISIT, EST, LEVL IV, 30-39 MIN: ICD-10-PCS | Mod: S$GLB,,, | Performed by: NURSE PRACTITIONER

## 2020-08-01 RX ORDER — CARVEDILOL 12.5 MG/1
12.5 TABLET ORAL
COMMUNITY
Start: 2020-03-23

## 2020-08-01 RX ORDER — MEDICAL SUPPLY, MISCELLANEOUS
MISCELLANEOUS MISCELLANEOUS
COMMUNITY
Start: 2020-02-23 | End: 2021-11-12

## 2020-08-01 RX ORDER — PRAVASTATIN SODIUM 40 MG/1
40 TABLET ORAL
COMMUNITY
Start: 2014-11-07 | End: 2021-11-12

## 2020-08-01 RX ORDER — MECLIZINE HYDROCHLORIDE 25 MG/1
25 TABLET ORAL 3 TIMES DAILY PRN
Qty: 30 TABLET | Refills: 0 | Status: SHIPPED | OUTPATIENT
Start: 2020-08-01 | End: 2021-02-26

## 2020-08-01 RX ORDER — LISINOPRIL 20 MG/1
40 TABLET ORAL
COMMUNITY
Start: 2014-06-09

## 2020-08-01 RX ORDER — BUMETANIDE 0.5 MG/1
TABLET ORAL
COMMUNITY
Start: 2018-06-20

## 2020-08-01 RX ORDER — FLUTICASONE PROPIONATE 50 MCG
1 SPRAY, SUSPENSION (ML) NASAL
COMMUNITY
Start: 2020-06-03 | End: 2021-06-03

## 2020-08-01 RX ORDER — LANCETS
1 EACH MISCELLANEOUS
COMMUNITY
Start: 2016-06-01

## 2020-08-01 RX ORDER — AMLODIPINE BESYLATE 10 MG/1
10 TABLET ORAL
COMMUNITY
Start: 2020-03-23

## 2020-08-01 RX ORDER — CARVEDILOL 12.5 MG/1
TABLET ORAL
COMMUNITY
Start: 2020-06-16

## 2020-08-01 RX ORDER — AMLODIPINE BESYLATE 10 MG/1
TABLET ORAL
COMMUNITY
Start: 2020-06-16

## 2020-08-01 RX ORDER — LANCETS
1 EACH MISCELLANEOUS
COMMUNITY
Start: 2013-10-07

## 2020-08-01 RX ORDER — ALLOPURINOL 300 MG/1
300 TABLET ORAL
COMMUNITY
Start: 2020-07-08

## 2020-08-01 RX ORDER — LOSARTAN POTASSIUM 100 MG/1
TABLET ORAL
COMMUNITY
Start: 2020-06-16 | End: 2021-11-12

## 2020-08-01 RX ORDER — PANTOPRAZOLE SODIUM 40 MG/1
40 TABLET, DELAYED RELEASE ORAL
COMMUNITY
Start: 2020-03-23

## 2020-08-01 RX ORDER — LOSARTAN POTASSIUM 100 MG/1
100 TABLET ORAL
COMMUNITY
Start: 2020-03-23

## 2020-08-01 RX ORDER — ALBUTEROL SULFATE 90 UG/1
2 AEROSOL, METERED RESPIRATORY (INHALATION)
COMMUNITY
Start: 2020-03-23

## 2020-08-01 RX ORDER — ASPIRIN 81 MG/1
81 TABLET ORAL
COMMUNITY
Start: 2019-09-12

## 2020-08-01 RX ORDER — METHOCARBAMOL 750 MG/1
500 TABLET, FILM COATED ORAL 4 TIMES DAILY
COMMUNITY

## 2020-08-01 NOTE — PROGRESS NOTES
Subjective:       Patient ID: Ninfa Philip is a 69 y.o. female.    Vitals:  weight is 64.9 kg (143 lb). Her temperature is 98.3 °F (36.8 °C). Her blood pressure is 104/60 and her pulse is 59 (abnormal). Her respiration is 16 and oxygen saturation is 98%.     Chief Complaint: Dizziness    Dizziness:   Chronicity:  New  Onset:  Yesterday  Progression since onset:  Unchanged  Frequency:  Constantly  Severity:  Mild  Dizziness characteristics:  Off-balanceno fever, no headaches, no nausea, no vomiting, no weakness, no light-headedness and no chest pain.  Treatments tried:  Nothing      Constitution: Negative for chills, fatigue and fever.   HENT: Negative for congestion and sore throat.    Neck: Negative for painful lymph nodes.   Cardiovascular: Negative for chest pain and leg swelling.   Eyes: Negative for double vision and blurred vision.   Respiratory: Negative for cough and shortness of breath.    Gastrointestinal: Negative for nausea, vomiting and diarrhea.   Genitourinary: Negative for dysuria, frequency, urgency and history of kidney stones.   Musculoskeletal: Negative for joint pain, joint swelling, muscle cramps and muscle ache.   Skin: Negative for color change, pale, rash and bruising.   Allergic/Immunologic: Negative for seasonal allergies.   Neurological: Positive for dizziness and history of vertigo. Negative for light-headedness, passing out and headaches.   Hematologic/Lymphatic: Negative for swollen lymph nodes.   Psychiatric/Behavioral: Negative for nervous/anxious, sleep disturbance and depression. The patient is not nervous/anxious.        Objective:      Physical Exam   Constitutional: She is oriented to person, place, and time. She appears well-developed. She is cooperative.  Non-toxic appearance. She does not appear ill. No distress.   HENT:   Head: Normocephalic and atraumatic.   Ears:   Right Ear: Hearing, tympanic membrane, external ear and ear canal normal.   Left Ear: Hearing, tympanic  membrane, external ear and ear canal normal.   Nose: Nose normal. No mucosal edema, rhinorrhea or nasal deformity. No epistaxis. Right sinus exhibits no maxillary sinus tenderness and no frontal sinus tenderness. Left sinus exhibits no maxillary sinus tenderness and no frontal sinus tenderness.   Mouth/Throat: Uvula is midline, oropharynx is clear and moist and mucous membranes are normal. No trismus in the jaw. Normal dentition. No uvula swelling. No posterior oropharyngeal erythema.   Eyes: Conjunctivae and lids are normal. Right eye exhibits no discharge. Left eye exhibits no discharge. No scleral icterus.      Comments: No nystagmus   Neck: Trachea normal, normal range of motion, full passive range of motion without pain and phonation normal. Neck supple.   Cardiovascular: Normal rate, regular rhythm, normal heart sounds and normal pulses.   Pulmonary/Chest: Effort normal and breath sounds normal. No respiratory distress.   Abdominal: Soft. Normal appearance and bowel sounds are normal. She exhibits no distension, no pulsatile midline mass and no mass. There is no abdominal tenderness.   Musculoskeletal: Normal range of motion.         General: No deformity.   Neurological: She is alert and oriented to person, place, and time. She exhibits normal muscle tone. Coordination normal.   Skin: Skin is warm, dry, intact, not diaphoretic and not pale. Psychiatric: Her speech is normal and behavior is normal. Judgment and thought content normal.   Nursing note and vitals reviewed.        Assessment:       1. Vertigo        Plan:       Patients chart reviewed. Hx of vertigo. Patient missed appt with ENT. Patient states meclizine helps.     Meclizine prescribed.     Referral to ENT sent.    Vertigo  -     Ambulatory referral/consult to ENT  -     meclizine (ANTIVERT) 25 mg tablet; Take 1 tablet (25 mg total) by mouth 3 (three) times daily as needed.  Dispense: 30 tablet; Refill: 0      Patient Instructions     Patient  Instructions   Meclizine as needed for dizziness.  Do not drive or operate heavy machinery on this medication as it may sedate you.  Rest.  Increase fluids.  Change positions slowly.  Follow up with PCP or ENT for persistent symptoms.  Go to the ER for any worsening symptoms including headache, visual changes, uncontrolled vomiting, uncontrolled dizziness, weakness, numbness, chest pain, shortness of breath.      Vertigo (Unknown Cause)    In addition to helping with hearing, the inner ear is part of the balance center of your body. Problems with the inner ear can a false feeling of motion. This is called vertigo. Often, it feels as if you or the room is spinning. A vertigo attack may cause sudden nausea, vomiting and heavy sweating. Severe vertigo causes a loss of balance and can cause you to fall. During vertigo, small head movements and changes in body position will often make the symptoms worse. You may also have ringing in the ears called tinnitus.  An episode of vertigo may last seconds, minutes or hours. Once you are over the first episode, it may never come back. However, symptoms may return off and on.  The cause of your vertigo is not yet known. Possible causes of vertigo include:  · Inflammation of the inner ear  · Disease of the nerves to the inner ear  · Movement of calcium particles in the inner ear  · Poor blood flow to the balance centers of the brain  · Migraine headaches  Home care  · If symptoms are severe, rest quietly in bed. Change positions very slowly. There is usually one position that will feel best, such as lying on one side or lying on your back with your head slightly raised on pillows.  · Do not drive a car or work with dangerous machinery until symptoms have been gone for at least one week.  · Take medicine as prescribed to relieve your symptoms. Unless another medicine was prescribed for symptoms of nausea, vomiting, and dizziness, you may use over-the-counter motion sickness  pills. Ask your pharmacist for suggestions.  Follow-up care  Follow up with your healthcare provider or as directed. If you are referred to a specialist or for testing, make the appointment promptly.  When to seek medical advice  Call your healthcare provider if any of the following occur:  · Fever of 100.4°F (38ºC) or higher, or as directed by your healthcare provider  · Vertigo worsens or is not controlled by prescribed medicine   · Repeated vomiting not relieved by prescribed medicine   · Severe headache  · Confusion  · Weakness of an arm or leg or one side of the face  · Difficulty with speech or vision  · Loss of consciousness   · Seizure  Date Last Reviewed: 8/16/2015  © 0742-9145 [employer]. [address] [place] [address], [place] [address]. All rights reserved. This information is not intended as a substitute for professional medical care. Always follow your healthcare professional's instructions.

## 2020-08-01 NOTE — PATIENT INSTRUCTIONS
Patient Instructions   Meclizine as needed for dizziness.  Do not drive or operate heavy machinery on this medication as it may sedate you.  Rest.  Increase fluids.  Change positions slowly.  Follow up with PCP or ENT for persistent symptoms.  Go to the ER for any worsening symptoms including headache, visual changes, uncontrolled vomiting, uncontrolled dizziness, weakness, numbness, chest pain, shortness of breath.      Vertigo (Unknown Cause)    In addition to helping with hearing, the inner ear is part of the balance center of your body. Problems with the inner ear can a false feeling of motion. This is called vertigo. Often, it feels as if you or the room is spinning. A vertigo attack may cause sudden nausea, vomiting and heavy sweating. Severe vertigo causes a loss of balance and can cause you to fall. During vertigo, small head movements and changes in body position will often make the symptoms worse. You may also have ringing in the ears called tinnitus.  An episode of vertigo may last seconds, minutes or hours. Once you are over the first episode, it may never come back. However, symptoms may return off and on.  The cause of your vertigo is not yet known. Possible causes of vertigo include:  · Inflammation of the inner ear  · Disease of the nerves to the inner ear  · Movement of calcium particles in the inner ear  · Poor blood flow to the balance centers of the brain  · Migraine headaches  Home care  · If symptoms are severe, rest quietly in bed. Change positions very slowly. There is usually one position that will feel best, such as lying on one side or lying on your back with your head slightly raised on pillows.  · Do not drive a car or work with dangerous machinery until symptoms have been gone for at least one week.  · Take medicine as prescribed to relieve your symptoms. Unless another medicine was prescribed for symptoms of nausea, vomiting, and dizziness, you may use over-the-counter motion sickness  pills. Ask your pharmacist for suggestions.  Follow-up care  Follow up with your healthcare provider or as directed. If you are referred to a specialist or for testing, make the appointment promptly.  When to seek medical advice  Call your healthcare provider if any of the following occur:  · Fever of 100.4°F (38ºC) or higher, or as directed by your healthcare provider  · Vertigo worsens or is not controlled by prescribed medicine   · Repeated vomiting not relieved by prescribed medicine   · Severe headache  · Confusion  · Weakness of an arm or leg or one side of the face  · Difficulty with speech or vision  · Loss of consciousness   · Seizure  Date Last Reviewed: 8/16/2015  © 8815-9171 [employer]. [address] [place] [address], [place] [address]. All rights reserved. This information is not intended as a substitute for professional medical care. Always follow your healthcare professional's instructions.

## 2020-08-17 ENCOUNTER — CLINICAL SUPPORT (OUTPATIENT)
Dept: AUDIOLOGY | Facility: CLINIC | Age: 70
End: 2020-08-17
Payer: MEDICARE

## 2020-08-17 ENCOUNTER — OFFICE VISIT (OUTPATIENT)
Dept: OTOLARYNGOLOGY | Facility: CLINIC | Age: 70
End: 2020-08-17
Payer: MEDICARE

## 2020-08-17 DIAGNOSIS — R09.81 NASAL CONGESTION: ICD-10-CM

## 2020-08-17 DIAGNOSIS — Z01.10 NORMAL HEARING EXAM: Primary | ICD-10-CM

## 2020-08-17 DIAGNOSIS — G47.9 SLEEP DISTURBANCE: ICD-10-CM

## 2020-08-17 DIAGNOSIS — R42 DIZZINESS: ICD-10-CM

## 2020-08-17 DIAGNOSIS — H91.90 PERCEIVED HEARING LOSS: ICD-10-CM

## 2020-08-17 DIAGNOSIS — R06.83 SNORING: Primary | ICD-10-CM

## 2020-08-17 PROCEDURE — 99999 PR PBB SHADOW E&M-EST. PATIENT-LVL II: ICD-10-PCS | Mod: PBBFAC,,, | Performed by: NURSE PRACTITIONER

## 2020-08-17 PROCEDURE — 99999 PR PBB SHADOW E&M-EST. PATIENT-LVL I: ICD-10-PCS | Mod: PBBFAC,,, | Performed by: AUDIOLOGIST

## 2020-08-17 PROCEDURE — 92567 TYMPANOMETRY: CPT | Mod: PBBFAC | Performed by: AUDIOLOGIST

## 2020-08-17 PROCEDURE — 92557 COMPREHENSIVE HEARING TEST: CPT | Mod: PBBFAC | Performed by: AUDIOLOGIST

## 2020-08-17 PROCEDURE — 99999 PR PBB SHADOW E&M-EST. PATIENT-LVL II: CPT | Mod: PBBFAC,,, | Performed by: NURSE PRACTITIONER

## 2020-08-17 PROCEDURE — 99212 OFFICE O/P EST SF 10 MIN: CPT | Mod: PBBFAC,25 | Performed by: NURSE PRACTITIONER

## 2020-08-17 PROCEDURE — 99213 OFFICE O/P EST LOW 20 MIN: CPT | Mod: S$PBB,ICN,, | Performed by: NURSE PRACTITIONER

## 2020-08-17 PROCEDURE — 99999 PR PBB SHADOW E&M-EST. PATIENT-LVL I: CPT | Mod: PBBFAC,,, | Performed by: AUDIOLOGIST

## 2020-08-17 PROCEDURE — 99211 OFF/OP EST MAY X REQ PHY/QHP: CPT | Mod: PBBFAC,27,25 | Performed by: AUDIOLOGIST

## 2020-08-17 PROCEDURE — 99213 PR OFFICE/OUTPT VISIT, EST, LEVL III, 20-29 MIN: ICD-10-PCS | Mod: S$PBB,ICN,, | Performed by: NURSE PRACTITIONER

## 2020-08-17 NOTE — LETTER
August 17, 2020      Thuy Cunningham NP  [address] [facility] [place] [address]           [facility] Otorhinol[facility]  [address]  [facility] [address]  Phone: [phone]  Fax: [phone]          Patient: Ninfa Philip   MR Number: [id number]   YOB: 1950   Date of Visit: 8/17/2020       Dear Thuy Cunningham:    Thank you for referring Ninfa Philip to me for evaluation. Attached you will find relevant portions of my assessment and plan of care.    If you have questions, please do not hesitate to call me. I look forward to following Ninfa Philip along with you.    Sincerely,    Alex Pike, NP    Enclosure  CC:  No Recipients    If you would like to receive this communication electronically, please contact [email or web address] or [phone] to request more information on [employer] Link access.    For providers and/or their staff who would like to refer a patient to [facility], please contact us through our one-stop-shop provider referral line, [facility] , at [phone].    If you feel you have received this communication in error or would no longer like to receive these types of communications, please e-mail [email or web address]

## 2020-08-17 NOTE — PROGRESS NOTES
Ninfa Philip was seen today in the clinic for an audiologic evaluation.  Patients main complaint was snoring.  Mrs. Philip denied any hearing loss or tinnitus.  She reported that she does have trouble with ear wax.    Otoscopy revealed a mild-moderate amount of cerumen, AU.  Tympanometry revealed Type A in the right ear and Type A in the left ear.  Audiogram results revealed essentially normal hearing in the right and left ear.  Speech reception thresholds were noted at 10 dB in the right ear and 10 dB in the left ear.  Speech discrimination scores were 100% in the right ear and 100% in the left ear.    Recommendations:  1. Otologic evaluation  2. Annual audiogram  3. Noise protection when in noise

## 2020-08-17 NOTE — PROGRESS NOTES
"  Subjective:      Ninfa Philip is a 69 y.o. female who was referred to me by Thuy Cunningham in consultation for dizziness.    Patient is referred to me for dizziness, but patient states she no longer has dizziness and would like to discuss snoring today. She states she snores every night. She denies day time tiredness, but states she does wake up usually 3 times during the night to use the restroom. She states her snoring is affecting her relationship with her fiance'. She reports intermittent nasal congestion but improved with nasal fluticasone. She denies sinus pressure, hyposmia, headache, nasal drainage, itchy nose or sneezing. She denies a history of sinus, nasal or throat surgery. She does have a history of thyromegaly which is being managed by her PCP, Dr. Ames, at [facility]. She reports having an US 6 month ago that was "normal." She also reports having normal thyroid labwork. She states she was recommended surgery vs monitor, but has chosen to monitor it.     Current sinonasal medications as above.  She does not regularly use nasal decongestant sprays.    She does not recall previously having allergy testing.    She denies a history of asthma.    She relates a history of reflux symptoms which is currently managed with pantoprazole.      She denies have a diagnosis of obstructive sleep apnea without regular CPAP use.     She has not had sinonasal surgery.    She does not recall a prior history of nasal trauma.        Past Medical History  She has a past medical history of Allergic rhinitis, CKD (chronic kidney disease) stage 3, GFR 30-59 ml/min, Diabetes mellitus, Diabetes mellitus, type 2, Gout, Heart attack, High cholesterol, Hypertension, Myocardial infarct, Normocytic anemia, Osteoarthritis, Sacroiliac joint dysfunction, and Vertigo.    Past Surgical History  She has a past surgical history that includes Hysterectomy; Thyroid cyst excision; and Breast surgery.    Family " History  Her family history includes No Known Problems in her father and mother.    Social History  She reports that she has never smoked. She has never used smokeless tobacco. She reports current alcohol use. She reports that she does not use drugs.    Allergies  She is allergic to codeine; colchicine analogues; doxycycline; equagesic [meprobamate-aspirin]; indocin [indomethacin sodium]; penicillins; tramadol; and vicodin [hydrocodone-acetaminophen].    Medications   She has a current medication list which includes the following prescription(s): albuterol, allopurinol, allopurinol, amlodipine, amlodipine, aspirin, blood sugar diagnostic, blood sugar diagnostic, bumetanide, bumetanide, carvedilol, carvedilol, carvedilol, fluticasone propionate, fluticasone propionate, glipizide, lancets, lancets, lisinopril, lisinopril, losartan, losartan, meclizine, meloxicam, methocarbamol, c-tub, pantoprazole, pravastatin, and pravastatin.    Review of Systems  Review of Systems   Constitutional: Negative for chills, fatigue and fever.   HENT: Positive for congestion. Negative for facial swelling, nosebleeds, postnasal drip, rhinorrhea, sinus pressure, sinus pain, sneezing, sore throat and tinnitus.    Eyes: Negative for photophobia, redness, itching and visual disturbance.   Respiratory: Negative for apnea, cough, shortness of breath, wheezing and stridor.         Snoring   Cardiovascular: Negative for chest pain and palpitations.   Gastrointestinal: Negative for diarrhea, nausea and vomiting.   Endocrine: Negative.    Genitourinary: Negative for decreased urine volume, dysuria and frequency.   Musculoskeletal: Negative for arthralgias, myalgias and neck stiffness.   Skin: Negative for rash and wound.   Allergic/Immunologic: Negative for environmental allergies, food allergies and immunocompromised state.   Neurological: Negative for dizziness, syncope, weakness, light-headedness and headaches.   Hematological: Negative for  adenopathy. Does not bruise/bleed easily.   Psychiatric/Behavioral: Negative for confusion, decreased concentration and sleep disturbance.          Objective:     There were no vitals taken for this visit.       Constitutional:   She is oriented to person, place, and time. Vital signs are normal. She appears well-developed and well-nourished. She appears alert. Normal speech.      Head:  Normocephalic and atraumatic.     Ears:    Right Ear: No lacerations. No drainage, swelling or tenderness. No foreign bodies. No mastoid tenderness. Tympanic membrane is not injected, not scarred, not perforated, not erythematous, not retracted and not bulging. Tympanic membrane mobility is normal. No middle ear effusion. No hemotympanum. No decreased hearing is noted.   Left Ear: No lacerations. No drainage, swelling or tenderness. No foreign bodies. No mastoid tenderness. Tympanic membrane is not injected, not scarred, not perforated, not erythematous, not retracted and not bulging. Tympanic membrane mobility is normal.  No middle ear effusion. No hemotympanum. No decreased hearing is noted.     Nose:  No mucosal edema, rhinorrhea, nose lacerations, sinus tenderness, septal deviation, nasal septal hematoma or polyps. No epistaxis.  No foreign bodies. No turbinate hypertrophy.  Right sinus exhibits no maxillary sinus tenderness and no frontal sinus tenderness. Left sinus exhibits no maxillary sinus tenderness and no frontal sinus tenderness.     Mouth/Throat  Oropharynx clear and moist without lesions or asymmetry, normal uvula midline and lips, teeth, and gums normal. No uvula swelling, oral lesions, trismus, mucous membrane lesions or xerostomia. No oropharyngeal exudate, posterior oropharyngeal edema or posterior oropharyngeal erythema.   Tonsils 1+ bilaterally      Neck:  Neck normal without thyromegaly masses, asymmetry, normal tracheal structure, crepitus, and tenderness, trachea normal, full range of motion with neck supple  and no adenopathy. Thyroid mass and thyromegaly present.         Psychiatric:   She has a normal mood and affect. Her speech is normal and behavior is normal.     Neurological:   She is alert and oriented to person, place, and time. No cranial nerve deficit.     Skin:   No abrasions, lacerations, lesions, or rashes.       Procedure    None      Data Reviewed    WBC (K/uL)   Date Value   10/28/2017 10.59     Eosinophil% (%)   Date Value   10/28/2017 0.1     Eos # (K/uL)   Date Value   10/28/2017 0.0     Platelets (K/uL)   Date Value   10/28/2017 187     Glucose (mg/dL)   Date Value   10/28/2017 107     No results found for: IGE    No sinus imaging available.       I independently reviewed the tracings of the complete audiometric evaluation performed today.  I reviewed the audiogram with the patient as well.  Pertinent findings include a normal study.      Assessment:     1. Snoring    2. Dizziness    3. Nasal congestion    4. Sleep disturbance    5. Perceived hearing loss         Plan:     I had a long discussion with the patient regarding her condition and the further workup and management options.    Normal audiometric testing.  Patient has a enlarged right thyroid lobe vs thyroid mass, follow up with PCP who is currently managing.  I have discussed with patient options to evaluation her snoring. I recommended laryngoscopy and sleep evaluated the the sleep medicine clinic. He declines laryngoscopy.   I placed referral to Sleep medicine for sleep disturbance.  Continue fluticasone 1-2 sprays each nostril once daily for intermittent nasal congestion.  Dizziness is now resolved. Follow up if dizziness returns.    Follow up in about 6 months (around 2/17/2021).

## 2020-10-15 LAB
CREATININE,URINE: 163.3 MG/DL
MICROALBUMIN URINE: 622.7 MG/L
MICROALBUMIN/CREATININE RATIO: 381 UG/MG

## 2020-11-04 ENCOUNTER — OFFICE VISIT (OUTPATIENT)
Dept: URGENT CARE | Facility: CLINIC | Age: 70
End: 2020-11-04
Payer: MEDICARE

## 2020-11-04 VITALS
HEART RATE: 60 BPM | BODY MASS INDEX: 22.98 KG/M2 | SYSTOLIC BLOOD PRESSURE: 138 MMHG | HEIGHT: 66 IN | WEIGHT: 143 LBS | TEMPERATURE: 97 F | OXYGEN SATURATION: 98 % | RESPIRATION RATE: 16 BRPM | DIASTOLIC BLOOD PRESSURE: 65 MMHG

## 2020-11-04 DIAGNOSIS — M25.561 ACUTE PAIN OF RIGHT KNEE: Primary | ICD-10-CM

## 2020-11-04 DIAGNOSIS — M17.0 OSTEOARTHRITIS OF BOTH KNEES, UNSPECIFIED OSTEOARTHRITIS TYPE: ICD-10-CM

## 2020-11-04 PROCEDURE — 99214 OFFICE O/P EST MOD 30 MIN: CPT | Mod: S$GLB,,, | Performed by: STUDENT IN AN ORGANIZED HEALTH CARE EDUCATION/TRAINING PROGRAM

## 2020-11-04 PROCEDURE — 99214 PR OFFICE/OUTPT VISIT, EST, LEVL IV, 30-39 MIN: ICD-10-PCS | Mod: S$GLB,,, | Performed by: STUDENT IN AN ORGANIZED HEALTH CARE EDUCATION/TRAINING PROGRAM

## 2020-11-04 PROCEDURE — 73564 XR KNEE COMP 4 OR MORE VIEWS RIGHT: ICD-10-PCS | Mod: RT,S$GLB,, | Performed by: RADIOLOGY

## 2020-11-04 PROCEDURE — 73564 X-RAY EXAM KNEE 4 OR MORE: CPT | Mod: RT,S$GLB,, | Performed by: RADIOLOGY

## 2020-11-04 RX ORDER — ALBUTEROL SULFATE 90 UG/1
2 AEROSOL, METERED RESPIRATORY (INHALATION)
COMMUNITY
Start: 2020-10-15 | End: 2020-11-04

## 2020-11-04 RX ORDER — LANCETS
1 EACH MISCELLANEOUS
COMMUNITY
Start: 2020-10-15 | End: 2020-11-04

## 2020-11-04 RX ORDER — LOSARTAN POTASSIUM 100 MG/1
100 TABLET ORAL
COMMUNITY
Start: 2020-10-15 | End: 2020-11-04

## 2020-11-04 RX ORDER — PREDNISONE 10 MG/1
TABLET ORAL
Qty: 4 TABLET | Refills: 0 | Status: SHIPPED | OUTPATIENT
Start: 2020-11-04 | End: 2020-11-07

## 2020-11-04 RX ORDER — HYDROCHLOROTHIAZIDE 12.5 MG/1
12.5 CAPSULE ORAL
COMMUNITY
Start: 2020-10-15 | End: 2021-10-15

## 2020-11-04 RX ORDER — PANTOPRAZOLE SODIUM 40 MG/1
40 TABLET, DELAYED RELEASE ORAL
COMMUNITY
Start: 2020-10-15 | End: 2020-11-04

## 2020-11-04 RX ORDER — ALLOPURINOL 300 MG/1
300 TABLET ORAL
COMMUNITY
Start: 2020-10-15 | End: 2020-11-04

## 2020-11-04 RX ORDER — PRAVASTATIN SODIUM 40 MG/1
40 TABLET ORAL
COMMUNITY
Start: 2020-10-15 | End: 2020-11-04

## 2020-11-04 RX ORDER — FLUTICASONE PROPIONATE 50 MCG
1 SPRAY, SUSPENSION (ML) NASAL
COMMUNITY
Start: 2020-10-15 | End: 2020-11-04

## 2020-11-04 RX ORDER — CARVEDILOL 12.5 MG/1
12.5 TABLET ORAL
COMMUNITY
Start: 2020-10-15 | End: 2020-11-04

## 2020-11-04 RX ORDER — AMLODIPINE BESYLATE 10 MG/1
10 TABLET ORAL
COMMUNITY
Start: 2020-10-15 | End: 2020-11-04

## 2020-11-04 NOTE — PATIENT INSTRUCTIONS
Osteoarthritis  Osteoarthritis (also called degenerative joint disease) happens when the cartilage in a joint becomes damaged and worn. This may be due to age, wear and tear, overuse of the joint, or other problems. Osteoarthritis can affect any joint. But it is most common in hands, knees, spine, hips, and feet. Symptoms include joint stiffness, pain, and swelling.  Home care  · When a joint is more sore than usual, rest it for a day or two.  · Heat can help relieve stiffness. Take a hot bath or apply a heating pad for up to 30 minutes at a time. If symptoms are worse in the morning, using heat just after awakening can help relax the muscle and soothe the joints.   · Ice helps relieve pain and swelling. It is often used after activity. Use a cold pack wrapped in a thin cloth on the joint for 10 to 15 minutes at a time.   · Alternating hot and cold can also help relieve pain. Try this for 20 minutes at a time, several times per day.  · Exercise helps prevent the muscles and ligaments around the joint from becoming weak. It also helps maintain function in the joint.  Be as active as you can. Talk to your healthcare provider about what activity program is best for you.  · Excess weight puts a lot of extra strain on weight-bearing joints of the lower back, hips, knees, feet and ankles. If you are overweight, talk to your healthcare provider about a safe and effective weight loss program.  · Use anti-inflammatory medicines as prescribed for pain. This includes acetaminophen or NSAIDs such as ibuprofen or naproxen. If needed, topical or injected medicines may be recommended. Talk to your healthcare provider if these options are not enough to manage your pain.  · Talk with your healthcare provider about devices that might help improve your function and reduce pain.  Follow-up care  Follow up with your healthcare provider as advised by our staff.  When to seek medical advice  Call your healthcare provider right away if  any of these occur:  · Redness or swelling of a painful joint  · Discharge or pus from a painful joint  · Fever of 100.4ºF (38ºC) or higher, or as directed by your healthcare provider  · Worsening joint pain  · Decreased ability to move the joint or bear weight on the joint  Date Last Reviewed: 3/1/2017  © 9567-8978 [employer]. [address] [place] [address]. All rights reserved. This information is not intended as a substitute for professional medical care. Always follow your healthcare professional's instructions.         Detail Level: Detailed

## 2020-11-04 NOTE — PROGRESS NOTES
"Subjective:       Patient ID: Ninfa Philip is a 70 y.o. female.    Vitals:  height is 5' 6" (1.676 m) and weight is 64.9 kg (143 lb). Her temperature is 97.4 °F (36.3 °C). Her blood pressure is 138/65 and her pulse is 60. Her respiration is 16 and oxygen saturation is 98%.     Chief Complaint: Knee Pain    69yo F with PMHx of gout and OA of knees presents for R knee pain. Reports her R knee began to swell with pain with ambulation and straightening 4d ago without a known injury. This is not typical of her gout per pt, which always occurs in L great toe. Has tried tylenol and heat with moderate relief, states the swelling and pain has improved from yesterday. Denied f/c, erythema, wound, numbness, or weakness.    Knee Pain   The incident occurred 3 to 5 days ago. There was no injury mechanism. The pain is present in the right knee. The quality of the pain is described as aching (throbbing). The pain is at a severity of 10/10. The pain is severe. The pain has been constant since onset. Associated symptoms include a loss of motion. Pertinent negatives include no inability to bear weight, muscle weakness or numbness. She reports no foreign bodies present. The symptoms are aggravated by movement, weight bearing and palpation. She has tried heat and acetaminophen for the symptoms. The treatment provided moderate relief.       Constitution: Negative for chills, fatigue and fever.   HENT: Negative for facial swelling and facial trauma.    Neck: Negative for neck stiffness.   Cardiovascular: Negative for chest trauma.   Eyes: Negative for eye trauma, double vision and blurred vision.   Respiratory: Negative for cough.    Gastrointestinal: Negative for abdominal trauma, abdominal pain and rectal bleeding.   Musculoskeletal: Positive for pain, joint pain, joint swelling, arthritis and gout. Negative for trauma.   Skin: Negative for color change, pale, wound, abrasion, laceration, erythema and bruising.   Neurological: " Negative for dizziness, light-headedness, headaches, altered mental status, loss of consciousness and numbness.   Hematologic/Lymphatic: Negative for history of bleeding disorder.   Psychiatric/Behavioral: Negative for altered mental status, confusion, agitation and sleep disturbance.       Objective:      Physical Exam   Constitutional: She is oriented to person, place, and time. She appears well-developed. She does not appear ill. No distress.   HENT:   Head: Normocephalic and atraumatic.   Ears:   Right Ear: Hearing and external ear normal.   Left Ear: Hearing and external ear normal.   Mouth/Throat: Mucous membranes are normal.   Eyes: Conjunctivae, EOM and lids are normal. Right eye exhibits no discharge. Left eye exhibits no discharge. No scleral icterus.   Neck: Normal range of motion. Neck supple.   Cardiovascular: Normal rate, regular rhythm and normal pulses.   Abdominal: Normal appearance.   Musculoskeletal: Normal range of motion.         General: Swelling and tenderness present. No deformity.      Comments: R knee with moderate effusion, TTP along b/l joint line, no posterior TTP; full RoM but pain with full extension; no joint laxity, negative Sofy, no patellar pain; negative Dave's; antalgic gait   Neurological: She is alert and oriented to person, place, and time. She displays no weakness. No cranial nerve deficit (CN II-XII grossly intact) or sensory deficit. She exhibits normal muscle tone.   Skin: Skin is warm, dry, not pale and no rash. erythemaPsychiatric: Her speech is normal and behavior is normal. Judgment and thought content normal.   Nursing note and vitals reviewed.    XR Knees: bilateral tricompartmental disease with medial joint space narrowing and osteophyte formation, worse on R, without fracture or dislocation ( physician interpretation)      Assessment:       1. Acute pain of right knee    2. Osteoarthritis of both knees, unspecified osteoarthritis type        Plan:          Acute pain of right knee  -     X-Ray Knee Complete 4 Or More Views Right; Future; Expected date: 11/04/2020 - study independently reviewed and interpreted by  physician and discussed results with patient  -     predniSONE (DELTASONE) 10 MG tablet; Take 2 tablets (20 mg total) by mouth once daily for 1 day, THEN 1 tablet (10 mg total) once daily for 2 days.  Dispense: 4 tablet; Refill: 0      - risk of corticosteroids reviewed (elevated BP/glucose, insomnia, psychosis, bone loss, infection, etc) and patient expressed understanding; pt unable to take NSAIDs and has pain medication allergy, decided on low dose prednisone and to continue home tylenol with heat/icing and brace  -     Ambulatory referral/consult to Orthopedics    Osteoarthritis of both knees, unspecified osteoarthritis type  -     predniSONE (DELTASONE) 10 MG tablet; Take 2 tablets (20 mg total) by mouth once daily for 1 day, THEN 1 tablet (10 mg total) once daily for 2 days.  Dispense: 4 tablet; Refill: 0  -     Ambulatory referral/consult to Orthopedics    Results, medications and diagnosis reviewed with patient, questions answered, and return precautions given    Follow up in about 1 month (around 12/4/2020) for re-evaluation with Orthopedics, or sooner if worsening.    Mohsen Bonds MD/MPH  [facility]  [facility]

## 2020-11-12 ENCOUNTER — OFFICE VISIT (OUTPATIENT)
Dept: ORTHOPEDICS | Facility: CLINIC | Age: 70
End: 2020-11-12
Payer: MEDICARE

## 2020-11-12 DIAGNOSIS — M17.11 OSTEOARTHRITIS OF RIGHT KNEE, UNSPECIFIED OSTEOARTHRITIS TYPE: Primary | ICD-10-CM

## 2020-11-12 PROCEDURE — 99203 PR OFFICE/OUTPT VISIT, NEW, LEVL III, 30-44 MIN: ICD-10-PCS | Mod: S$PBB,,, | Performed by: ORTHOPAEDIC SURGERY

## 2020-11-12 PROCEDURE — 99999 PR PBB SHADOW E&M-EST. PATIENT-LVL IV: CPT | Mod: PBBFAC,,, | Performed by: ORTHOPAEDIC SURGERY

## 2020-11-12 PROCEDURE — 99203 OFFICE O/P NEW LOW 30 MIN: CPT | Mod: S$PBB,,, | Performed by: ORTHOPAEDIC SURGERY

## 2020-11-12 PROCEDURE — 99999 PR PBB SHADOW E&M-EST. PATIENT-LVL IV: ICD-10-PCS | Mod: PBBFAC,,, | Performed by: ORTHOPAEDIC SURGERY

## 2020-11-12 PROCEDURE — 99214 OFFICE O/P EST MOD 30 MIN: CPT | Mod: PBBFAC | Performed by: ORTHOPAEDIC SURGERY

## 2020-11-12 NOTE — LETTER
November 12, 2020      Mohsen Bonds MD  [address] [facility] [address]           [facility] 5th [address]  [address]  [facility] [address]  Phone: [phone]          Patient: Ninfa Philip   MR Number: [id number]   YOB: 1950   Date of Visit: 11/12/2020       Dear Dr. Mohsen Bonds:    Thank you for referring Ninfa Philip to me for evaluation. Attached you will find relevant portions of my assessment and plan of care.    If you have questions, please do not hesitate to call me. I look forward to following Ninfa Philip along with you.    Sincerely,    Chevy Tello MD    Enclosure  CC:  No Recipients    If you would like to receive this communication electronically, please contact [email or web address] or [phone] to request more information on [employer] access.    For providers and/or their staff who would like to refer a patient to [facility], please contact us through our one-stop-shop provider referral line, [facility], at [phone].    If you feel you have received this communication in error or would no longer like to receive these types of communications, please e-mail [email or web address]

## 2020-11-12 NOTE — PROGRESS NOTES
Subjective:      Patient ID: Ninfa Philip is a 70 y.o. female.    Chief Complaint: Pain of the Right Knee    HPI   Ninfa Philip is a 70 y.o. female with PMH significant for CKD 3, T2DM, HLD, MI presenting with R knee pain.  Patient endorses a several year history of right knee pain and swelling that has acutely worsened over the past month.  Denies any acute trauma or inciting event caused this pain swelling.  Saw urgent care last week and obtained x-rays which are significant for osteoarthritis and prescribed her a course of prednisone.  Endorses only taking a few days but with complete resolution of her pain and swelling.  Prior to presenting to urgent care she endorsed symptoms of acute pain randomly causing her R knee to give out and falling, but has been able to bear weight after each event.  Denies any catching, locking.  Pain was worse in the morning and better with activity.  Difficulty going up and down stairs getting out of cars.  Prior to steroids, used Aspercreme and heat to help manage her pain intermittently. Patient denies numbness and tingling. Denies any other musculoskeletal pain or injuries. No known history of prior RLE surgery. Walks w/out assisted devices at baseline. Doesn't take any anticoagulation at baseline.  Of note, the patient endorses she has run marathons and 26 years ago was recommended by an orthopedic surgeon to have either a cartilage or meniscal surgery as she did not pursue.  Presents today with no pain or swelling and presents to our clinic for initial evaluation.    Past Medical History:   Diagnosis Date    Allergic rhinitis     CKD (chronic kidney disease) stage 3, GFR 30-59 ml/min     Diabetes mellitus     Diabetes mellitus, type 2     Gout     Heart attack     2001, 2006    High cholesterol     Hypertension     Myocardial infarct     Normocytic anemia     Osteoarthritis     Sacroiliac joint dysfunction     Vertigo        Current Outpatient Medications  on File Prior to Visit   Medication Sig Dispense Refill    albuterol (PROVENTIL/VENTOLIN HFA) 90 mcg/actuation inhaler Inhale 2 puffs into the lungs.      allopurinol (ZYLOPRIM) 300 MG tablet Take 1 tablet (300 mg total) by mouth once daily. 90 tablet 3    allopurinoL (ZYLOPRIM) 300 MG tablet Take 300 mg by mouth.      amLODIPine (NORVASC) 10 MG tablet Take 10 mg by mouth.      amLODIPine (NORVASC) 10 MG tablet TK 1 T PO D      aspirin (ECOTRIN) 81 MG EC tablet Take 81 mg by mouth.      blood sugar diagnostic Strp 1 strip.      blood sugar diagnostic Strp 1 Bottle.      bumetanide (BUMEX) 0.5 MG Tab TAKE 1 TABLET BY MOUTH EVERY MORNING AS NEEDED FOR SWELLING 30 tablet 1    bumetanide (BUMEX) 0.5 MG Tab TAKE 1 TABLET BY MOUTH EVERY MORNING AS NEEDED FOR SWELLING      carvediloL (COREG) 12.5 MG tablet Take 12.5 mg by mouth.      carvediloL (COREG) 12.5 MG tablet TK 1 T PO BID WC      carvedilol (COREG) 6.25 MG tablet Take 6.25 mg by mouth 2 (two) times daily.      fluticasone propionate (FLONASE) 50 mcg/actuation nasal spray 1 spray by Each Nare route once daily.      fluticasone propionate (FLONASE) 50 mcg/actuation nasal spray 1 spray by Nasal route.      glipiZIDE (GLUCOTROL) 2.5 MG TR24 Take 2.5 mg by mouth daily with breakfast.       hydroCHLOROthiazide (MICROZIDE) 12.5 mg capsule Take 12.5 mg by mouth.      lancets Misc 1 Bottle.      lancets Misc 1 Bottle.      lisinopriL (PRINIVIL,ZESTRIL) 20 MG tablet Take 40 mg by mouth.      lisinopril (PRINIVIL,ZESTRIL) 40 MG tablet Take 40 mg by mouth once daily.      losartan (COZAAR) 100 MG tablet Take 100 mg by mouth.      losartan (COZAAR) 100 MG tablet TK 1 T PO D      meclizine (ANTIVERT) 25 mg tablet Take 1 tablet (25 mg total) by mouth 3 (three) times daily as needed. 30 tablet 0    meloxicam (MOBIC) 7.5 MG tablet Take 1 tablet (7.5 mg total) by mouth once daily. 28 tablet 0    methocarbamoL (ROBAXIN) 750 MG Tab Take 500 mg by mouth 4  (four) times daily.      miscellaneous medical supply (C-TUB) Misc Compression stocking, small, 1 each      pantoprazole (PROTONIX) 40 MG tablet Take 40 mg by mouth.      pravastatin (PRAVACHOL) 40 MG tablet Take 40 mg by mouth once daily.      pravastatin (PRAVACHOL) 40 MG tablet Take 40 mg by mouth.       No current facility-administered medications on file prior to visit.        Past Surgical History:   Procedure Laterality Date    BREAST SURGERY      HYSTERECTOMY      THYROID CYST EXCISION         Family History   Problem Relation Age of Onset    No Known Problems Mother     No Known Problems Father        Social History     Socioeconomic History    Marital status:      Spouse name: Not on file    Number of children: Not on file    Years of education: Not on file    Highest education level: Not on file   Occupational History    Not on file   Social Needs    Financial resource strain: Not on file    Food insecurity     Worry: Not on file     Inability: Not on file    Transportation needs     Medical: Not on file     Non-medical: Not on file   Tobacco Use    Smoking status: Never Smoker    Smokeless tobacco: Never Used   Substance and Sexual Activity    Alcohol use: Yes     Comment: occasional    Drug use: No    Sexual activity: Not on file   Lifestyle    Physical activity     Days per week: Not on file     Minutes per session: Not on file    Stress: Not on file   Relationships    Social connections     Talks on phone: Not on file     Gets together: Not on file     Attends Voodoo service: Not on file     Active member of club or organization: Not on file     Attends meetings of clubs or organizations: Not on file     Relationship status: Not on file   Other Topics Concern    Not on file   Social History Narrative    Not on file     ROS   Constitutional: negative for fevers  Eyes: negative visual changes  ENT: negative for hearing loss  Respiratory: negative for  dyspnea  Cardiovascular: negative for chest pain  Gastrointestinal: negative for abdominal pain  Genitourinary: negative for dysuria  Neurological: negative for headaches  Behavioral/Psych: negative for hallucinations  Endocrine: negative for temperature intolerance      Objective:      There were no vitals filed for this visit.    Ortho/SPM Exam  PE:  Gen:  No acute distress  CV:  Peripherally well-perfused.    Lungs:  Normal respiratory effort.  Head/Neck:  Normocephalic.  Atraumatic.     MSK:  RLE:  - Skin intact throughout, no open wounds  - mild right knee effusion, with significant tenderness to palpation over the medial joint line and over medial parapatellar area  - patient is 5/5 strength in quad/TA/EHL/Gastroc/FHL/Hamstrings  - ROM the knee from 5-110 (0-130 the left) without any chondromalacia or grinding evident  - no pain to varus or valgus stress, negative Lachman and Sofy  - negative ESPERANZA and FADIR  - SILT throughout  - DP and PT palpated  - Capillary Refill <3s  - Negative Log roll  - Negative Stinchfield    IMAGING:   Bilateral knee x-rays on 11/04/2012 tricompartmental arthritis most significantly in the right knee with bone-on-bone in the medial compartment and with osteophyte formation, otherwise no acute fracture dislocations.  Mild varus.       Assessment:       Encounter Diagnosis   Name Primary?    Osteoarthritis of right knee, unspecified osteoarthritis type Yes          Plan:       Ninfa was seen today for pain.    Diagnoses and all orders for this visit:    Osteoarthritis of right knee, unspecified osteoarthritis type     Treatment options were discussed with the patient.  Patient was educated that she has a mechanical problem that is partially being treated with a chemical solution such as her prednisone provided to her by urgent care.  Nevertheless, given that her symptoms have basically resolved from her medication, she has still stiffness and lack of range of motion her right  knee likely due to arthritis.  Therefore recommended course of physical therapy and will refer her to our joint surgeons specialists for discussion of possible knee replacement if her symptoms have worsen, continued to persist, or not improved in 6 weeks.  Physical therapy referral placed for the patient.

## 2020-11-19 ENCOUNTER — CLINICAL SUPPORT (OUTPATIENT)
Dept: REHABILITATION | Facility: OTHER | Age: 70
End: 2020-11-19
Payer: MEDICARE

## 2020-11-19 DIAGNOSIS — G89.29 CHRONIC PAIN OF RIGHT KNEE: ICD-10-CM

## 2020-11-19 DIAGNOSIS — M25.661 DECREASED RANGE OF MOTION (ROM) OF RIGHT KNEE: ICD-10-CM

## 2020-11-19 DIAGNOSIS — M17.11 OSTEOARTHRITIS OF RIGHT KNEE, UNSPECIFIED OSTEOARTHRITIS TYPE: ICD-10-CM

## 2020-11-19 DIAGNOSIS — M25.561 CHRONIC PAIN OF RIGHT KNEE: ICD-10-CM

## 2020-11-19 PROCEDURE — 97110 THERAPEUTIC EXERCISES: CPT | Mod: PN | Performed by: PHYSICAL THERAPIST

## 2020-11-19 PROCEDURE — 97161 PT EVAL LOW COMPLEX 20 MIN: CPT | Mod: PN | Performed by: PHYSICAL THERAPIST

## 2020-11-19 NOTE — PLAN OF CARE
"[facility]  Physical Therapy Initial Evaluation    Date: 11/19/2020   Name: Ninfa Philip  Clinic Number: [id number]    Therapy Diagnosis:   Encounter Diagnoses   Name Primary?    Osteoarthritis of right knee, unspecified osteoarthritis type     Chronic pain of right knee     Decreased range of motion (ROM) of right knee      Physician: Vasiliy Timmons MD    Physician Orders: PT Eval and Treat   Medical Diagnosis from Referral: M17.11 (ICD-10-CM) - Osteoarthritis of right knee, unspecified osteoarthritis type  Evaluation Date: 11/19/2020  Authorization Period Expiration: 11/12/2021  Plan of Care Expiration: 2/19/2021  Visit # / Visits authorized: 1/ 1    Time In: 11:30 am  Time Out: 12:10 am  Total Appointment Time (timed & untimed codes): 40 minutes    Precautions: Standard    Subjective   Date of onset: years  History of current condition - Ninfa reports: Intermittent R knee pain worsening over the last several years. More stiffness than pain and seen by orthopedics last month. Pt reports having "bone on bone" and total knee replacement might be in her future. Her pain is medial knee and worse with straightening. Has been active all her life and finds that it keeps her from being able to walk as briskly or get out of a chair as easily. "Im a get up and go kind of person". Also notes difficulty getting out of bathtub in her apartment and needs use of handicap bars. She walks for exercise due to hx of 3 heart attacks.      Medical History:   Past Medical History:   Diagnosis Date    Allergic rhinitis     CKD (chronic kidney disease) stage 3, GFR 30-59 ml/min     Diabetes mellitus     Diabetes mellitus, type 2     Gout     Heart attack     2001, 2006    High cholesterol     Hypertension     Myocardial infarct     Normocytic anemia     Osteoarthritis     Sacroiliac joint dysfunction     Vertigo        Surgical History:   Ninfa Philip  has a past surgical " history that includes Hysterectomy; Thyroid cyst excision; and Breast surgery.    Medications:   Ninfa has a current medication list which includes the following prescription(s): albuterol, allopurinol, allopurinol, amlodipine, amlodipine, aspirin, blood sugar diagnostic, blood sugar diagnostic, bumetanide, bumetanide, carvedilol, carvedilol, carvedilol, fluticasone propionate, fluticasone propionate, glipizide, hydrochlorothiazide, lancets, lancets, lisinopril, lisinopril, losartan, losartan, meclizine, meloxicam, methocarbamol, c-tub, pantoprazole, pravastatin, and pravastatin.    Allergies:   Review of patient's allergies indicates:   Allergen Reactions    Codeine      Not sure of reaction    Colchicine analogues Nausea And Vomiting    Doxycycline      Not sure of reaction    Equagesic [meprobamate-aspirin]      hyperactivity    Indocin [indomethacin sodium]      Not sure of reaction    Penicillins      Not sure of reaction    Tramadol Nausea And Vomiting    Vicodin [hydrocodone-acetaminophen]      Not sure of reaction        Imaging, radiographs : FINDINGS:  No acute fracture or significant suprapatellar joint effusion.  Tiny enchondroma or infarct in the distal right femoral diaphysis.     Tricompartmental osteophyte formation with advanced joint space narrowing of the medial tibiofemoral compartment.     Impression:     No acute osseous abnormality seen.  Osteoarthritis with joint space narrowing.    Prior Therapy: yes, for R thumb pain with good results   Social History: lives with partner, soon to be fiance. He recently had back surgery and caring for him. 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 Fully retired in 2012 but can't keep still   Prior Level of Function: independent with ADL's, walking for exercise most days of the week; active with the children in Judaism prior to the  pandemic   Current Level of Function: independent with ADL's, walking with a limp, stiffness when getting out of a chair, difficulty getting out of bathtub     Pain:  Current 0/10, worst 10/10, best 0/10   Location: right medial knee  Description: stiff  Aggravating Factors: Morning, Extension and Getting out of bed/chair  Easing Factors: topical cream, bending it, must sleep with knee bent    Patients goals: To avoid surgery if possible and be able to stay active     Objective     Observation: genu varus, bony enlargement R knee      Range of Motion:   Knee Left active Left Passive Right Active R passive   Flexion 130 135 100 115   Extension 0 0 -14 -8*         Lower Extremity Strength  Right LE  Left LE    Knee extension: 4+/5* Knee extension: 5/5   Knee flexion: 5/5 Knee flexion: 5/5   Hip flexion: 4+/5 Hip flexion: 5/5   Hip extension:  4+/5 Hip extension: 5/5   Hip abduction: 4+/5 Hip abduction: 5/5   Hip adduction: 4/5 Hip adduction 5/5   Ankle dorsiflexion: 5/5 Ankle dorsiflexion: 5/5   Ankle plantarflexion: 5/5 Ankle plantarflexion: 5/5           Special Tests:   Right   Valgus Stress Test Positive R   Varus Stress test negative       Function:  - Sit <--> Stand:indep   - Bed Mobility: indep    TU sec  Sit to stand 30 sec: 7 x    Joint Mobility: crepitous with  Patellar mobility, decreased medial glide    Palpation: TTP medial jt line    Flexibility:    Ely's test: R = 100 degrees ; L = 115 degrees   SLR: >70 deg    Edema: mild        Limitation/Restriction for FOTO knee Survey    Therapist reviewed FOTO scores for Ninfa Philip on 2020.   FOTO documents entered into [employer] - see Media section.    Limitation Score: 24%         TREATMENT   Treatment Time In: 11:50  Treatment Time Out: 12:10  Total Treatment time (time-based codes) separate from Evaluation: 20 minutes    Ninfa received therapeutic exercises to develop strength, ROM and flexibility for 20 minutes including:    Heel slides with  "Sheet x 20  QS 5 sec x 20  SLR x 10  LAQ x 20  Prone knee flexion str 30 " x 2  Prone knee hangs x 5 min    Home Exercises and Patient Education Provided    Education provided:   - HEP    Written Home Exercises Provided: yes.  Exercises were reviewed and Ninfa was able to demonstrate them prior to the end of the session.  Ninfa demonstrated good  understanding of the education provided.     See EMR under Patient Instructions for exercises provided 11/19/2020.    Assessment   Ninfa is a 70 y.o. female referred to outpatient Physical Therapy with a medical diagnosis of R knee OA. Patient presents with decreased R knee ROM, joint stiffness with crepitous, genu varum deformity with medial jt line tenderness, pain provoked with valgus stress, quad weakness, and antalgic gait. Pt with decreased functional mobility with ambulation, transfers from chair/ tub, and bending.     Patient prognosis is Good.   Patientt will benefit from skilled outpatient Physical Therapy to address the deficits stated above and in the chart below, provide patient /family education, and to maximize patientt's level of independence.     Plan of care discussed with patient: Yes  Patient's spiritual, cultural and educational needs considered and patient is agreeable to the plan of care and goals as stated below:     Anticipated Barriers for therapy: none    Medical Necessity is demonstrated by the following  History  Co-morbidities and personal factors that may impact the plan of care Co-morbidities:   none    Personal Factors:   no deficits     low   Examination  Body Structures and Functions, activity limitations and participation restrictions that may impact the plan of care Body Regions:   lower extremities    Body Systems:    ROM  strength  gait  transfers    Participation Restrictions:   Walking, transfers, bending    Activity limitations:   Learning and applying knowledge  no deficits    General Tasks and Commands  no " deficits    Communication  no deficits    Mobility  walking    Self care  washing oneself (bathing, drying, washing hands)    Domestic Life  doing house work (cleaning house, washing dishes, laundry)    Interactions/Relationships  no deficits    Life Areas  no deficits    Community and Social Life  community life  recreation and leisure         high   Clinical Presentation stable and uncomplicated low   Decision Making/ Complexity Score: low     Goals:  Short term (6 weeks):  1. Pt's R knee AROM flexion to be 120 degrees or greater  2. Pt to have increased R knee extension by 10 degrees or greater for improved gait quality and posture  4. Pt to have improved LE endurance as noted by 10 or greater sit to stands in 30 sec  5. Pt to report decreased pain R knee by 30% or greater with ADL's  Long term (12 weeks)  1. Pt to be independent with home exercise program for improved self management of condition  2. Pt to have decreased subjective report of disability as noted by 20-40% on FOTO knee questionnaire   3. Pt to be able to ascend/ descend 1 flight of stairs with reciprocal gait pattern and use of hand rail modified independent  4. Pt's R knee strength to be 5/5 or greater for improved performance of ADL's such as lifting    Plan   Plan of care Certification: 11/19/2020 to 2/19/2021.    Outpatient Physical Therapy 1 times weekly for 10 weeks to include the following interventions: Gait Training, Manual Therapy, Moist Heat/ Ice, Neuromuscular Re-ed, Patient Education, Therapeutic Activites and Therapeutic Exercise.     Celsa Pleitez, PT

## 2020-11-20 PROBLEM — M25.661 DECREASED RANGE OF MOTION (ROM) OF RIGHT KNEE: Status: ACTIVE | Noted: 2020-11-20

## 2020-11-20 PROBLEM — M25.561 CHRONIC PAIN OF RIGHT KNEE: Status: ACTIVE | Noted: 2020-11-20

## 2020-11-20 PROBLEM — G89.29 CHRONIC PAIN OF RIGHT KNEE: Status: ACTIVE | Noted: 2020-11-20

## 2021-02-26 ENCOUNTER — OFFICE VISIT (OUTPATIENT)
Dept: URGENT CARE | Facility: CLINIC | Age: 71
End: 2021-02-26
Payer: MEDICARE

## 2021-02-26 VITALS
WEIGHT: 143 LBS | OXYGEN SATURATION: 98 % | RESPIRATION RATE: 16 BRPM | TEMPERATURE: 99 F | SYSTOLIC BLOOD PRESSURE: 107 MMHG | HEART RATE: 49 BPM | BODY MASS INDEX: 22.98 KG/M2 | DIASTOLIC BLOOD PRESSURE: 60 MMHG | HEIGHT: 66 IN

## 2021-02-26 DIAGNOSIS — R42 VERTIGO: Primary | ICD-10-CM

## 2021-02-26 DIAGNOSIS — R00.1 BRADYCARDIA: ICD-10-CM

## 2021-02-26 DIAGNOSIS — S16.1XXA CERVICAL MUSCLE STRAIN, INITIAL ENCOUNTER: ICD-10-CM

## 2021-02-26 PROCEDURE — 93010 ELECTROCARDIOGRAM REPORT: CPT | Mod: S$GLB,,, | Performed by: INTERNAL MEDICINE

## 2021-02-26 PROCEDURE — 99214 PR OFFICE/OUTPT VISIT, EST, LEVL IV, 30-39 MIN: ICD-10-PCS | Mod: S$GLB,,, | Performed by: NURSE PRACTITIONER

## 2021-02-26 PROCEDURE — 93010 EKG 12-LEAD: ICD-10-PCS | Mod: S$GLB,,, | Performed by: INTERNAL MEDICINE

## 2021-02-26 PROCEDURE — 99214 OFFICE O/P EST MOD 30 MIN: CPT | Mod: S$GLB,,, | Performed by: NURSE PRACTITIONER

## 2021-02-26 PROCEDURE — 93005 EKG 12-LEAD: ICD-10-PCS | Mod: S$GLB,,, | Performed by: NURSE PRACTITIONER

## 2021-02-26 PROCEDURE — 93005 ELECTROCARDIOGRAM TRACING: CPT | Mod: S$GLB,,, | Performed by: NURSE PRACTITIONER

## 2021-02-26 RX ORDER — MECLIZINE HYDROCHLORIDE 25 MG/1
25 TABLET ORAL 3 TIMES DAILY PRN
Qty: 30 TABLET | Refills: 0 | Status: SHIPPED | OUTPATIENT
Start: 2021-02-26 | End: 2023-10-30

## 2021-02-26 RX ORDER — METHOCARBAMOL 750 MG/1
750 TABLET, FILM COATED ORAL 4 TIMES DAILY
Qty: 28 TABLET | Refills: 0 | Status: SHIPPED | OUTPATIENT
Start: 2021-02-26 | End: 2021-03-05

## 2021-06-11 LAB — BMD RECOMMENDATION EXT: NORMAL

## 2021-11-12 ENCOUNTER — OFFICE VISIT (OUTPATIENT)
Dept: URGENT CARE | Facility: CLINIC | Age: 71
End: 2021-11-12
Payer: MEDICARE

## 2021-11-12 VITALS
WEIGHT: 154 LBS | TEMPERATURE: 98 F | SYSTOLIC BLOOD PRESSURE: 137 MMHG | BODY MASS INDEX: 24.75 KG/M2 | HEIGHT: 66 IN | OXYGEN SATURATION: 99 % | DIASTOLIC BLOOD PRESSURE: 76 MMHG | RESPIRATION RATE: 17 BRPM | HEART RATE: 61 BPM

## 2021-11-12 DIAGNOSIS — M77.30 CALCANEAL SPUR, UNSPECIFIED LATERALITY: ICD-10-CM

## 2021-11-12 DIAGNOSIS — S93.402A SPRAIN OF LEFT ANKLE, UNSPECIFIED LIGAMENT, INITIAL ENCOUNTER: Primary | ICD-10-CM

## 2021-11-12 DIAGNOSIS — M25.572 ACUTE LEFT ANKLE PAIN: ICD-10-CM

## 2021-11-12 PROCEDURE — 99213 OFFICE O/P EST LOW 20 MIN: CPT | Mod: S$GLB,,, | Performed by: NURSE PRACTITIONER

## 2021-11-12 PROCEDURE — 99213 PR OFFICE/OUTPT VISIT, EST, LEVL III, 20-29 MIN: ICD-10-PCS | Mod: S$GLB,,, | Performed by: NURSE PRACTITIONER

## 2021-11-12 PROCEDURE — 73610 X-RAY EXAM OF ANKLE: CPT | Mod: LT,S$GLB,, | Performed by: RADIOLOGY

## 2021-11-12 PROCEDURE — 73610 XR ANKLE COMPLETE 3 VIEW LEFT: ICD-10-PCS | Mod: LT,S$GLB,, | Performed by: RADIOLOGY

## 2022-05-13 ENCOUNTER — OFFICE VISIT (OUTPATIENT)
Dept: URGENT CARE | Facility: CLINIC | Age: 72
End: 2022-05-13
Payer: MEDICARE

## 2022-05-13 VITALS
HEART RATE: 56 BPM | HEIGHT: 66 IN | BODY MASS INDEX: 24.75 KG/M2 | RESPIRATION RATE: 18 BRPM | OXYGEN SATURATION: 99 % | SYSTOLIC BLOOD PRESSURE: 116 MMHG | TEMPERATURE: 98 F | DIASTOLIC BLOOD PRESSURE: 61 MMHG | WEIGHT: 154 LBS

## 2022-05-13 DIAGNOSIS — K59.00 CONSTIPATION, UNSPECIFIED CONSTIPATION TYPE: Primary | ICD-10-CM

## 2022-05-13 LAB
BILIRUB UR QL STRIP: POSITIVE
GLUCOSE UR QL STRIP: NEGATIVE
KETONES UR QL STRIP: NEGATIVE
LEUKOCYTE ESTERASE UR QL STRIP: NEGATIVE
PH, POC UA: 5 (ref 5–8)
POC BLOOD, URINE: NEGATIVE
POC NITRATES, URINE: NEGATIVE
PROT UR QL STRIP: POSITIVE
SP GR UR STRIP: 1.02 (ref 1–1.03)
UROBILINOGEN UR STRIP-ACNC: NORMAL (ref 0.1–1.1)

## 2022-05-13 PROCEDURE — 99213 PR OFFICE/OUTPT VISIT, EST, LEVL III, 20-29 MIN: ICD-10-PCS | Mod: S$GLB,,, | Performed by: NURSE PRACTITIONER

## 2022-05-13 PROCEDURE — 3008F PR BODY MASS INDEX (BMI) DOCUMENTED: ICD-10-PCS | Mod: CPTII,S$GLB,, | Performed by: NURSE PRACTITIONER

## 2022-05-13 PROCEDURE — 1160F PR REVIEW ALL MEDS BY PRESCRIBER/CLIN PHARMACIST DOCUMENTED: ICD-10-PCS | Mod: CPTII,S$GLB,, | Performed by: NURSE PRACTITIONER

## 2022-05-13 PROCEDURE — 3008F BODY MASS INDEX DOCD: CPT | Mod: CPTII,S$GLB,, | Performed by: NURSE PRACTITIONER

## 2022-05-13 PROCEDURE — 74019 RADEX ABDOMEN 2 VIEWS: CPT | Mod: S$GLB,,, | Performed by: RADIOLOGY

## 2022-05-13 PROCEDURE — 3078F PR MOST RECENT DIASTOLIC BLOOD PRESSURE < 80 MM HG: ICD-10-PCS | Mod: CPTII,S$GLB,, | Performed by: NURSE PRACTITIONER

## 2022-05-13 PROCEDURE — 1159F MED LIST DOCD IN RCRD: CPT | Mod: CPTII,S$GLB,, | Performed by: NURSE PRACTITIONER

## 2022-05-13 PROCEDURE — 99213 OFFICE O/P EST LOW 20 MIN: CPT | Mod: S$GLB,,, | Performed by: NURSE PRACTITIONER

## 2022-05-13 PROCEDURE — 1159F PR MEDICATION LIST DOCUMENTED IN MEDICAL RECORD: ICD-10-PCS | Mod: CPTII,S$GLB,, | Performed by: NURSE PRACTITIONER

## 2022-05-13 PROCEDURE — 81003 URINALYSIS AUTO W/O SCOPE: CPT | Mod: QW,S$GLB,, | Performed by: NURSE PRACTITIONER

## 2022-05-13 PROCEDURE — 3074F SYST BP LT 130 MM HG: CPT | Mod: CPTII,S$GLB,, | Performed by: NURSE PRACTITIONER

## 2022-05-13 PROCEDURE — 1125F PR PAIN SEVERITY QUANTIFIED, PAIN PRESENT: ICD-10-PCS | Mod: CPTII,S$GLB,, | Performed by: NURSE PRACTITIONER

## 2022-05-13 PROCEDURE — 3074F PR MOST RECENT SYSTOLIC BLOOD PRESSURE < 130 MM HG: ICD-10-PCS | Mod: CPTII,S$GLB,, | Performed by: NURSE PRACTITIONER

## 2022-05-13 PROCEDURE — 4010F PR ACE/ARB THEARPY RXD/TAKEN: ICD-10-PCS | Mod: CPTII,S$GLB,, | Performed by: NURSE PRACTITIONER

## 2022-05-13 PROCEDURE — 4010F ACE/ARB THERAPY RXD/TAKEN: CPT | Mod: CPTII,S$GLB,, | Performed by: NURSE PRACTITIONER

## 2022-05-13 PROCEDURE — 1160F RVW MEDS BY RX/DR IN RCRD: CPT | Mod: CPTII,S$GLB,, | Performed by: NURSE PRACTITIONER

## 2022-05-13 PROCEDURE — 3078F DIAST BP <80 MM HG: CPT | Mod: CPTII,S$GLB,, | Performed by: NURSE PRACTITIONER

## 2022-05-13 PROCEDURE — 74019 XR ABDOMEN FLAT AND ERECT: ICD-10-PCS | Mod: S$GLB,,, | Performed by: RADIOLOGY

## 2022-05-13 PROCEDURE — 1125F AMNT PAIN NOTED PAIN PRSNT: CPT | Mod: CPTII,S$GLB,, | Performed by: NURSE PRACTITIONER

## 2022-05-13 PROCEDURE — 81003 POCT URINALYSIS, DIPSTICK, AUTOMATED, W/O SCOPE: ICD-10-PCS | Mod: QW,S$GLB,, | Performed by: NURSE PRACTITIONER

## 2022-05-13 RX ORDER — POLYETHYLENE GLYCOL 3350 17 G/17G
17 POWDER, FOR SOLUTION ORAL DAILY
Qty: 507 G | Refills: 0 | Status: SHIPPED | OUTPATIENT
Start: 2022-05-13

## 2022-05-13 NOTE — PROGRESS NOTES
"Subjective:       Patient ID: Ninfa Philip is a 71 y.o. female.    Vitals:  height is 5' 6" (1.676 m) and weight is 69.9 kg (154 lb). Her temperature is 98.1 °F (36.7 °C). Her blood pressure is 116/61 and her pulse is 56 (abnormal). Her respiration is 18 and oxygen saturation is 99%.     Chief Complaint: Flank Pain (Left flank pain x 2 days)    70 y/o female presents to  today with c/o left flank pain x 2 days. Taking tylenol, but it is not really helping.  Denies dysuria. Feels like a knife is stabbing her in her left side, and the pain radiates to her left flank area.  Denies fever and illness.  She reports she usually has normal BMs. LBM this morning and normal. Denies gross hematuria. Denies nausea, vomiting, and diarrhea.  Denies blood in stool. Denies known muscle strain or injury, or fall.     Flank Pain  This is a new problem. The current episode started in the past 7 days. The problem occurs constantly. The problem has been rapidly worsening since onset. The quality of the pain is described as aching. The pain does not radiate. The pain is at a severity of 10/10. The pain is severe. The symptoms are aggravated by sitting and standing. Associated symptoms include abdominal pain. Pertinent negatives include no bladder incontinence, bowel incontinence or dysuria. She has tried NSAIDs for the symptoms. The treatment provided no relief.       Gastrointestinal: Positive for abdominal pain. Negative for nausea, vomiting, diarrhea, bright red blood in stool and bowel incontinence.   Genitourinary: Positive for flank pain. Negative for dysuria, frequency, urgency and bladder incontinence.   Musculoskeletal: Positive for pain.       Objective:      Physical Exam   Constitutional: She is oriented to person, place, and time.   HENT:   Head: Normocephalic and atraumatic.   Nose: Nose normal.   Mouth/Throat: Mucous membranes are moist. Oropharynx is clear.   Eyes: Right eye exhibits no discharge. Left eye exhibits " no discharge. No scleral icterus.   Neck: Neck supple.   Cardiovascular: Normal rate.   Pulmonary/Chest: Effort normal.   Abdominal: Normal appearance and bowel sounds are normal. She exhibits no distension. Soft. flat abdomen There is abdominal tenderness. There is no rebound, no guarding, no left CVA tenderness and no right CVA tenderness.   Musculoskeletal: Normal range of motion.         General: Normal range of motion.   Neurological: She is alert and oriented to person, place, and time.   Skin: Skin is warm and dry.   Psychiatric: Her behavior is normal. Mood normal.   Nursing note and vitals reviewed.      XR ABDOMEN FLAT AND ERECT    Result Date: 5/13/2022  EXAMINATION: XR ABDOMEN FLAT AND ERECT CLINICAL HISTORY: Unspecified abdominal pain TECHNIQUE: Flat and erect AP views of the abdomen were performed. COMPARISON: 10/27/2017 FINDINGS: Two upright views, 1 supine view. No significant air-fluid levels on upright view.  Air and stool is seen within the large bowel and projected over the rectum.  There is a large amount of stool throughout the colon.  No focally dilated small bowel loops.  There are no calcifications to suggest nephrolithiasis or cholelithiasis.  The osseous structures are intact.  No large volume free air or pneumatosis.  The lower lung zones are grossly clear.     1. Nonobstructive bowel gas pattern noting possible constipation. Electronically signed by: Ryan King MD Date:    05/13/2022 Time:    15:41  Assessment:       1. Constipation, unspecified constipation type          Plan:         Constipation, unspecified constipation type  -     Cancel: POCT URINALYSIS  -     POCT Urinalysis, Dipstick, Automated, W/O Scope  -     Cancel: XR KUB; Future; Expected date: 05/13/2022  -     XR ABDOMEN FLAT AND ERECT; Future; Expected date: 05/13/2022  -     polyethylene glycol (GLYCOLAX) 17 gram/dose powder; Take 17 g by mouth once daily.  Dispense: 507 g; Refill: 0      Patient Instructions    Glycerine suppositories  Or   Fleet enema;   Miralax powder (1 capful) mixed with juice or water    Increase water intake  Increase fiber in diet:  Beans  Broccoli  Apples  Almonds and other nuts  Brussel sprouts  Popcorn  Vegetables  Pears

## 2022-05-13 NOTE — PATIENT INSTRUCTIONS
Glycerine suppositories  Or   Fleet enema;   Miralax powder (1 capful) mixed with juice or water    Increase water intake  Increase fiber in diet:  Beans  Broccoli  Apples  Almonds and other nuts  Brussel sprouts  Popcorn  Vegetables  Pears

## 2022-06-26 ENCOUNTER — OFFICE VISIT (OUTPATIENT)
Dept: URGENT CARE | Facility: CLINIC | Age: 72
End: 2022-06-26
Payer: MEDICARE

## 2022-06-26 VITALS
SYSTOLIC BLOOD PRESSURE: 136 MMHG | OXYGEN SATURATION: 99 % | HEIGHT: 66 IN | TEMPERATURE: 98 F | BODY MASS INDEX: 24.75 KG/M2 | RESPIRATION RATE: 16 BRPM | WEIGHT: 154 LBS | HEART RATE: 68 BPM | DIASTOLIC BLOOD PRESSURE: 78 MMHG

## 2022-06-26 DIAGNOSIS — J30.9 ALLERGIC RHINITIS, UNSPECIFIED SEASONALITY, UNSPECIFIED TRIGGER: ICD-10-CM

## 2022-06-26 DIAGNOSIS — M65.28 CALCIFIC ACHILLES TENDONITIS: ICD-10-CM

## 2022-06-26 DIAGNOSIS — M10.9 ACUTE GOUT OF RIGHT ANKLE, UNSPECIFIED CAUSE: ICD-10-CM

## 2022-06-26 DIAGNOSIS — M25.571 ACUTE RIGHT ANKLE PAIN: Primary | ICD-10-CM

## 2022-06-26 DIAGNOSIS — M79.671 PAIN OF RIGHT HEEL: ICD-10-CM

## 2022-06-26 LAB
CTP QC/QA: YES
SARS-COV-2 RDRP RESP QL NAA+PROBE: NEGATIVE

## 2022-06-26 PROCEDURE — 1125F PR PAIN SEVERITY QUANTIFIED, PAIN PRESENT: ICD-10-PCS | Mod: CPTII,S$GLB,, | Performed by: PHYSICIAN ASSISTANT

## 2022-06-26 PROCEDURE — U0002 COVID-19 LAB TEST NON-CDC: HCPCS | Mod: QW,S$GLB,, | Performed by: PHYSICIAN ASSISTANT

## 2022-06-26 PROCEDURE — 1160F PR REVIEW ALL MEDS BY PRESCRIBER/CLIN PHARMACIST DOCUMENTED: ICD-10-PCS | Mod: CPTII,S$GLB,, | Performed by: PHYSICIAN ASSISTANT

## 2022-06-26 PROCEDURE — 4010F ACE/ARB THERAPY RXD/TAKEN: CPT | Mod: CPTII,S$GLB,, | Performed by: PHYSICIAN ASSISTANT

## 2022-06-26 PROCEDURE — 1160F RVW MEDS BY RX/DR IN RCRD: CPT | Mod: CPTII,S$GLB,, | Performed by: PHYSICIAN ASSISTANT

## 2022-06-26 PROCEDURE — 3078F DIAST BP <80 MM HG: CPT | Mod: CPTII,S$GLB,, | Performed by: PHYSICIAN ASSISTANT

## 2022-06-26 PROCEDURE — 4010F PR ACE/ARB THEARPY RXD/TAKEN: ICD-10-PCS | Mod: CPTII,S$GLB,, | Performed by: PHYSICIAN ASSISTANT

## 2022-06-26 PROCEDURE — 99214 OFFICE O/P EST MOD 30 MIN: CPT | Mod: S$GLB,,, | Performed by: PHYSICIAN ASSISTANT

## 2022-06-26 PROCEDURE — 73630 X-RAY EXAM OF FOOT: CPT | Mod: RT,S$GLB,, | Performed by: RADIOLOGY

## 2022-06-26 PROCEDURE — 1159F MED LIST DOCD IN RCRD: CPT | Mod: CPTII,S$GLB,, | Performed by: PHYSICIAN ASSISTANT

## 2022-06-26 PROCEDURE — 3008F BODY MASS INDEX DOCD: CPT | Mod: CPTII,S$GLB,, | Performed by: PHYSICIAN ASSISTANT

## 2022-06-26 PROCEDURE — 1159F PR MEDICATION LIST DOCUMENTED IN MEDICAL RECORD: ICD-10-PCS | Mod: CPTII,S$GLB,, | Performed by: PHYSICIAN ASSISTANT

## 2022-06-26 PROCEDURE — 3008F PR BODY MASS INDEX (BMI) DOCUMENTED: ICD-10-PCS | Mod: CPTII,S$GLB,, | Performed by: PHYSICIAN ASSISTANT

## 2022-06-26 PROCEDURE — 73630 XR FOOT COMPLETE 3 VIEW RIGHT: ICD-10-PCS | Mod: RT,S$GLB,, | Performed by: RADIOLOGY

## 2022-06-26 PROCEDURE — 99214 PR OFFICE/OUTPT VISIT, EST, LEVL IV, 30-39 MIN: ICD-10-PCS | Mod: S$GLB,,, | Performed by: PHYSICIAN ASSISTANT

## 2022-06-26 PROCEDURE — 3075F SYST BP GE 130 - 139MM HG: CPT | Mod: CPTII,S$GLB,, | Performed by: PHYSICIAN ASSISTANT

## 2022-06-26 PROCEDURE — 1125F AMNT PAIN NOTED PAIN PRSNT: CPT | Mod: CPTII,S$GLB,, | Performed by: PHYSICIAN ASSISTANT

## 2022-06-26 PROCEDURE — U0002: ICD-10-PCS | Mod: QW,S$GLB,, | Performed by: PHYSICIAN ASSISTANT

## 2022-06-26 PROCEDURE — 3075F PR MOST RECENT SYSTOLIC BLOOD PRESS GE 130-139MM HG: ICD-10-PCS | Mod: CPTII,S$GLB,, | Performed by: PHYSICIAN ASSISTANT

## 2022-06-26 PROCEDURE — 3078F PR MOST RECENT DIASTOLIC BLOOD PRESSURE < 80 MM HG: ICD-10-PCS | Mod: CPTII,S$GLB,, | Performed by: PHYSICIAN ASSISTANT

## 2022-06-26 RX ORDER — AZELASTINE 1 MG/ML
1 SPRAY, METERED NASAL 2 TIMES DAILY
Qty: 30 ML | Refills: 0 | Status: SHIPPED | OUTPATIENT
Start: 2022-06-26 | End: 2022-07-26

## 2022-06-26 RX ORDER — ERGOCALCIFEROL 1.25 MG/1
50000 CAPSULE ORAL
COMMUNITY
Start: 2022-03-25

## 2022-06-26 RX ORDER — ALENDRONATE SODIUM 70 MG/1
70 TABLET ORAL
COMMUNITY
Start: 2022-03-25

## 2022-06-26 RX ORDER — HYDROCHLOROTHIAZIDE 12.5 MG/1
12.5 CAPSULE ORAL DAILY
COMMUNITY
Start: 2022-03-25

## 2022-06-26 RX ORDER — METHYLPREDNISOLONE 4 MG/1
TABLET ORAL
Qty: 1 EACH | Refills: 0 | Status: SHIPPED | OUTPATIENT
Start: 2022-06-26 | End: 2023-10-30

## 2022-06-26 RX ORDER — CETIRIZINE HYDROCHLORIDE 10 MG/1
10 TABLET ORAL DAILY
Qty: 30 TABLET | Refills: 0 | Status: SHIPPED | OUTPATIENT
Start: 2022-06-26 | End: 2022-07-26

## 2022-06-26 NOTE — PATIENT INSTRUCTIONS
GOUT vs. ACHILLES TENDINITIS:     - Rest.    - Drink plenty of fluids.    - Tylenol as directed as needed for fever/pain. Avoid tylenol if you have a history of liver disease. Do not take ibuprofen if you have a history of GI bleeding, kidney disease, or if you take blood thinners.     - You can take prescribed cetirizine / zyrtec as directed. These are antihistamines that can help with runny nose, nasal congestion, sneezing, and helps to dry up post-nasal drip, which usually causes sore throat and cough.    -azelastine spray is a nasal antihistamine spray that helps with sinus issues and post nasal drip.     - You can use Flonase (fluticasone) nasal spray as directed for sinus congestion and postnasal drip. This is a steroid nasal spray that works locally over time to decrease the inflammation in your nose/sinuses and help with allergic symptoms. This is not an quick- relief spray like afrin, but it works well if used daily.  Discontinue if you develop nose bleed  - use nasal saline prior to Flonase.  - Use Ocean Spray Nasal Saline 1-3 puffs each nostril every 2-3 hours then blow out onto tissue. This is to irrigate the nasal passage way to clear the sinus openings. Use until sinus problem resolved.    - Dextromethorphan (DM) is a cough suppressant over the counter (ie. mucinex DM, robitussin, delsym; dayquil/nyquil has DM as well.)    - You received a steroid (pack) today.  This can elevate your blood pressure, elevate your blood sugar, water weight gain, nervous energy, redness to the face and dimpling of the skin where the shot goes in.   - Do not use steroids more than 3 times per year.   - If you have diabetes, please check you blood sugar frequently.  - If you have high blood pressure, please check your blood pressure frequently.     - Follow up with your PCP or specialty clinic as directed in the next 1-2 weeks if not improved or as needed.  You can call [phone] to schedule an appointment with the  appropriate provider.      - Go to the ER if you develop new or worsening symptoms.     - You must understand that you have received an Urgent Care treatment only and that you may be released before all of your medical problems are known or treated.   - You, the patient, will arrange for follow up care as instructed.   - If your condition worsens or fails to improve we recommend that you receive another evaluation at the ER immediately or contact your PCP to discuss your concerns or return here.

## 2022-06-26 NOTE — PROGRESS NOTES
"Subjective:       Patient ID: Ninfa Philip is a 71 y.o. female.    Vitals:  height is 5' 6" (1.676 m) and weight is 69.9 kg (154 lb). Her temperature is 98.3 °F (36.8 °C). Her blood pressure is 136/78 and her pulse is 68. Her respiration is 16 and oxygen saturation is 99%.     Chief Complaint: Foot Swelling and Sinus Problem    Pt presents with complaint of right ankle pain and swelling that began 4 days ago. No injury or trauma.  She tried soaking in epsom salts, she took tylenol and none of that helped. Pain is brought on by weight bearing, walking, and palpation. Pt has hx of gout and says it feels similar to other gout flares, though she was told she has arthritis in this foot as well, so does not know if there is a spur or arthritis.   Pt is c/o watery, itchy eyes, post nasal drip, rhinorrhea, mild infrequent cough, and ear pain (ear pain has resolved) for the past 2 weeks. No fever. No sinus pain. Pt states that this correlated with sarVisier dust.       Sinus Problem  This is a new problem. The current episode started 1 to 4 weeks ago. The problem is unchanged. There has been no fever. Her pain is at a severity of 0/10. She is experiencing no pain. Associated symptoms include congestion, coughing and ear pain (resolved). Pertinent negatives include no chills, diaphoresis, headaches, hoarse voice, neck pain, shortness of breath, sinus pressure, sneezing, sore throat or swollen glands. Treatments tried: Antihistamines. The treatment provided no relief.   Ankle Pain   The incident occurred 3 to 5 days ago. There was no injury mechanism. The pain is present in the right foot and right ankle. The pain is at a severity of 10/10. Pertinent negatives include no numbness.       Constitution: Negative for chills, sweating, fatigue and fever.   HENT: Positive for ear pain (resolved), congestion and postnasal drip. Negative for sinus pressure and sore throat.    Neck: Negative for neck pain and neck stiffness. "   Cardiovascular: Negative for chest pain, leg swelling, palpitations and sob on exertion.   Eyes: Negative for eye itching, eye pain and eye redness.   Respiratory: Positive for cough. Negative for chest tightness, bloody sputum and shortness of breath.    Gastrointestinal: Negative for abdominal pain, nausea, vomiting and diarrhea.   Genitourinary: Negative for dysuria, frequency, urgency and flank pain.   Musculoskeletal: Positive for pain, joint pain and joint swelling. Negative for trauma and abnormal ROM of joint.   Skin: Negative for color change, rash and erythema.   Allergic/Immunologic: Positive for environmental allergies and seasonal allergies. Negative for sneezing.   Neurological: Negative for headaches, disorientation, numbness and tingling.   Psychiatric/Behavioral: Negative for disorientation.       Objective:      Physical Exam   Constitutional: She is oriented to person, place, and time. She appears well-developed. She is cooperative.  Non-toxic appearance. She does not appear ill. No distress.   HENT:   Head: Normocephalic and atraumatic. Head is without abrasion, without contusion and without laceration.   Ears:   Right Ear: Hearing, tympanic membrane, external ear and ear canal normal.   Left Ear: Hearing, tympanic membrane, external ear and ear canal normal.   Nose: Mucosal edema present. No rhinorrhea or nasal deformity. No epistaxis. Right sinus exhibits no maxillary sinus tenderness and no frontal sinus tenderness. Left sinus exhibits no maxillary sinus tenderness and no frontal sinus tenderness.   Mouth/Throat: Uvula is midline, oropharynx is clear and moist and mucous membranes are normal. No trismus in the jaw. Normal dentition. No uvula swelling. No oropharyngeal exudate, posterior oropharyngeal edema or posterior oropharyngeal erythema.   Eyes: Conjunctivae, EOM and lids are normal. Pupils are equal, round, and reactive to light. No scleral icterus.   Neck: Trachea normal and phonation  normal. Neck supple. No edema present. No erythema present. No neck rigidity present.   Cardiovascular: Normal rate, regular rhythm, normal heart sounds and normal pulses.   Pulmonary/Chest: Effort normal and breath sounds normal. No accessory muscle usage or stridor. No tachypnea and no bradypnea. No respiratory distress. She has no decreased breath sounds. She has no wheezes. She has no rhonchi. She has no rales.   Abdominal: Normal appearance.   Musculoskeletal: Normal range of motion.         General: No deformity. Normal range of motion.        Feet:       Comments: Achilles tendon is intact.    Lymphadenopathy:        Head (right side): No submandibular, no preauricular and no posterior auricular adenopathy present.        Head (left side): No submandibular, no preauricular and no posterior auricular adenopathy present.     She has no cervical adenopathy.   Neurological: She is alert and oriented to person, place, and time. She exhibits normal muscle tone. Coordination normal.   Skin: Skin is warm, dry, intact, not diaphoretic, not pale and no rash. Capillary refill takes less than 2 seconds. No abrasion, No burn, No bruising, No erythema and No ecchymosis   Psychiatric: Her speech is normal and behavior is normal. Judgment and thought content normal.   Nursing note and vitals reviewed.          XR FOOT COMPLETE 3 VIEW RIGHT    Result Date: 6/26/2022  EXAMINATION: XR FOOT COMPLETE 3 VIEW RIGHT CLINICAL HISTORY: . Pain in right foot TECHNIQUE: AP, lateral, and oblique views of the right foot were performed. COMPARISON: None FINDINGS: Mild hallux valgus.  Mild degenerative changes the 1st metatarsophalangeal joint.  No acute, displaced fracture, aggressive osseous abnormality or dislocation.  Small inferior calcaneal enthesophyte.  No focal soft tissue abnormality.     No acute osseous abnormality. Electronically signed by: Anita Rodriguez Date:    06/26/2022 Time:    10:57    Assessment:       1. Acute right ankle  pain    2. Allergic rhinitis, unspecified seasonality, unspecified trigger    3. Pain of right heel    4. Calcific Achilles tendonitis    5. Acute gout of right ankle, unspecified cause          Plan:       As discussed with patient, x ray shows no posterior calcaneal spur but what appears to be calcific tendinitis of achilles. Likely cause of pain is gout of ankle vs. Calcific tendinitis. Pt with hx ckd, will refrain from nsaid, and colchicine is listed as allergy. Will do trial of corticosteroid for inflammation/potential gout. Instructed follow up with her podiatrist. Not consistent with fracture or infection at this time. Seek medical attention sooner for new or worsening symptoms.   Acute right ankle pain  -     XR FOOT COMPLETE 3 VIEW RIGHT; Future; Expected date: 06/26/2022    Allergic rhinitis, unspecified seasonality, unspecified trigger  -     POCT COVID-19 Rapid Screening  -     azelastine (ASTELIN) 137 mcg (0.1 %) nasal spray; 1 spray (137 mcg total) by Nasal route 2 (two) times daily.  Dispense: 30 mL; Refill: 0  -     cetirizine (ZYRTEC) 10 MG tablet; Take 1 tablet (10 mg total) by mouth once daily.  Dispense: 30 tablet; Refill: 0    Pain of right heel  -     XR FOOT COMPLETE 3 VIEW RIGHT; Future; Expected date: 06/26/2022    Calcific Achilles tendonitis    Acute gout of right ankle, unspecified cause  -     methylPREDNISolone (MEDROL DOSEPACK) 4 mg tablet; use as directed  Dispense: 1 each; Refill: 0      Patient Instructions   GOUT vs. ACHILLES TENDINITIS:     - Rest.    - Drink plenty of fluids.    - Tylenol as directed as needed for fever/pain. Avoid tylenol if you have a history of liver disease. Do not take ibuprofen if you have a history of GI bleeding, kidney disease, or if you take blood thinners.     - You can take prescribed cetirizine / zyrtec as directed. These are antihistamines that can help with runny nose, nasal congestion, sneezing, and helps to dry up post-nasal drip, which usually  causes sore throat and cough.    -azelastine spray is a nasal antihistamine spray that helps with sinus issues and post nasal drip.     - You can use Flonase (fluticasone) nasal spray as directed for sinus congestion and postnasal drip. This is a steroid nasal spray that works locally over time to decrease the inflammation in your nose/sinuses and help with allergic symptoms. This is not an quick- relief spray like afrin, but it works well if used daily.  Discontinue if you develop nose bleed  - use nasal saline prior to Flonase.  - Use Ocean Spray Nasal Saline 1-3 puffs each nostril every 2-3 hours then blow out onto tissue. This is to irrigate the nasal passage way to clear the sinus openings. Use until sinus problem resolved.    - Dextromethorphan (DM) is a cough suppressant over the counter (ie. mucinex DM, robitussin, delsym; dayquil/nyquil has DM as well.)    - You received a steroid (pack) today.  This can elevate your blood pressure, elevate your blood sugar, water weight gain, nervous energy, redness to the face and dimpling of the skin where the shot goes in.   - Do not use steroids more than 3 times per year.   - If you have diabetes, please check you blood sugar frequently.  - If you have high blood pressure, please check your blood pressure frequently.     - Follow up with your PCP or specialty clinic as directed in the next 1-2 weeks if not improved or as needed.  You can call [phone] to schedule an appointment with the appropriate provider.      - Go to the ER if you develop new or worsening symptoms.     - You must understand that you have received an Urgent Care treatment only and that you may be released before all of your medical problems are known or treated.   - You, the patient, will arrange for follow up care as instructed.   - If your condition worsens or fails to improve we recommend that you receive another evaluation at the ER immediately or contact your PCP to discuss your concerns  or return here.

## 2022-10-03 ENCOUNTER — OFFICE VISIT (OUTPATIENT)
Dept: URGENT CARE | Facility: CLINIC | Age: 72
End: 2022-10-03
Payer: MEDICARE

## 2022-10-03 VITALS
OXYGEN SATURATION: 98 % | TEMPERATURE: 98 F | RESPIRATION RATE: 18 BRPM | HEIGHT: 66 IN | SYSTOLIC BLOOD PRESSURE: 124 MMHG | WEIGHT: 154 LBS | HEART RATE: 74 BPM | DIASTOLIC BLOOD PRESSURE: 82 MMHG | BODY MASS INDEX: 24.75 KG/M2

## 2022-10-03 DIAGNOSIS — R05.1 ACUTE COUGH: ICD-10-CM

## 2022-10-03 DIAGNOSIS — R09.81 SINUS CONGESTION: ICD-10-CM

## 2022-10-03 DIAGNOSIS — J06.9 VIRAL URI WITH COUGH: Primary | ICD-10-CM

## 2022-10-03 PROCEDURE — 4010F PR ACE/ARB THEARPY RXD/TAKEN: ICD-10-PCS | Mod: CPTII,S$GLB,,

## 2022-10-03 PROCEDURE — 1160F PR REVIEW ALL MEDS BY PRESCRIBER/CLIN PHARMACIST DOCUMENTED: ICD-10-PCS | Mod: CPTII,S$GLB,,

## 2022-10-03 PROCEDURE — 3008F PR BODY MASS INDEX (BMI) DOCUMENTED: ICD-10-PCS | Mod: CPTII,S$GLB,,

## 2022-10-03 PROCEDURE — 1159F MED LIST DOCD IN RCRD: CPT | Mod: CPTII,S$GLB,,

## 2022-10-03 PROCEDURE — 1160F RVW MEDS BY RX/DR IN RCRD: CPT | Mod: CPTII,S$GLB,,

## 2022-10-03 PROCEDURE — 1125F PR PAIN SEVERITY QUANTIFIED, PAIN PRESENT: ICD-10-PCS | Mod: CPTII,S$GLB,,

## 2022-10-03 PROCEDURE — 99213 OFFICE O/P EST LOW 20 MIN: CPT | Mod: S$GLB,,,

## 2022-10-03 PROCEDURE — 3074F PR MOST RECENT SYSTOLIC BLOOD PRESSURE < 130 MM HG: ICD-10-PCS | Mod: CPTII,S$GLB,,

## 2022-10-03 PROCEDURE — 4010F ACE/ARB THERAPY RXD/TAKEN: CPT | Mod: CPTII,S$GLB,,

## 2022-10-03 PROCEDURE — 1159F PR MEDICATION LIST DOCUMENTED IN MEDICAL RECORD: ICD-10-PCS | Mod: CPTII,S$GLB,,

## 2022-10-03 PROCEDURE — 3079F PR MOST RECENT DIASTOLIC BLOOD PRESSURE 80-89 MM HG: ICD-10-PCS | Mod: CPTII,S$GLB,,

## 2022-10-03 PROCEDURE — 1125F AMNT PAIN NOTED PAIN PRSNT: CPT | Mod: CPTII,S$GLB,,

## 2022-10-03 PROCEDURE — 3079F DIAST BP 80-89 MM HG: CPT | Mod: CPTII,S$GLB,,

## 2022-10-03 PROCEDURE — 99213 PR OFFICE/OUTPT VISIT, EST, LEVL III, 20-29 MIN: ICD-10-PCS | Mod: S$GLB,,,

## 2022-10-03 PROCEDURE — 3074F SYST BP LT 130 MM HG: CPT | Mod: CPTII,S$GLB,,

## 2022-10-03 PROCEDURE — 3008F BODY MASS INDEX DOCD: CPT | Mod: CPTII,S$GLB,,

## 2022-10-03 RX ORDER — BENZONATATE 200 MG/1
200 CAPSULE ORAL 3 TIMES DAILY PRN
Qty: 30 CAPSULE | Refills: 0 | Status: SHIPPED | OUTPATIENT
Start: 2022-10-03 | End: 2022-10-13

## 2022-10-03 NOTE — PATIENT INSTRUCTIONS
- Flnoase and Astelin for nasal congestion and post nasal drip.   - Cetrizine for congestion.  - Benzonatate for cough.     - You must understand that you have received an Urgent Care treatment only and that you may be released before all of your medical problems are known or treated.   - You, the patient, will arrange for follow up care as instructed.   - If your condition worsens or fails to improve we recommend that you receive another evaluation at the ER immediately or contact your PCP to discuss your concerns or return here.   - Follow up with your PCP or specialty clinic as directed in the next 1-2 weeks if not improved or as needed.  You can call [phone] to schedule an appointment with the appropriate provider.    If your symptoms do not improve or worsen, go to the emergency room immediately.

## 2022-10-03 NOTE — PROGRESS NOTES
"Subjective:       Patient ID: Ninfa Philip is a 72 y.o. female.    Vitals:  height is 5' 6" (1.676 m) and weight is 69.9 kg (154 lb). Her temperature is 97.7 °F (36.5 °C). Her blood pressure is 124/82 and her pulse is 74. Her respiration is 18 and oxygen saturation is 98%.     Chief Complaint: Sinus Problem    Patient presents for sinus pressure and congestion since last Wednesday that is persistent. Denies worsening of symptoms. Having left sided ear pressure as well. Has been taking Astelin and cetirizine for symptoms with mild relief. Denies fever, body aches or chils. Denies sick contacts.     Sinus Problem  This is a new problem. The current episode started in the past 7 days. The problem is unchanged. There has been no fever. Her pain is at a severity of 2/10. The pain is mild. Associated symptoms include coughing (DRY), ear pain (LT), headaches, sinus pressure and sneezing. Pertinent negatives include no congestion, diaphoresis or swollen glands. Past treatments include oral decongestants and saline sprays. The treatment provided mild relief.     Constitution: Negative for sweating.   HENT:  Positive for ear pain (LT) and sinus pressure. Negative for congestion.    Respiratory:  Positive for cough (DRY).    Allergic/Immunologic: Positive for sneezing.   Neurological:  Positive for headaches. Negative for disorientation and altered mental status.   Psychiatric/Behavioral:  Negative for altered mental status and disorientation.      Objective:      Physical Exam   Constitutional: She is oriented to person, place, and time. She appears well-developed. She is cooperative.  Non-toxic appearance. She does not appear ill. No distress.      Comments:Patient sits comfortably in exam chair. Answers questions in complete sentences. Does not show any signs of distress or discoloration.        HENT:   Head: Normocephalic and atraumatic.   Ears:   Right Ear: Hearing, tympanic membrane, external ear and ear canal " normal. There is cerumen present.   Left Ear: Hearing, tympanic membrane, external ear and ear canal normal. There is cerumen present.   Nose: Rhinorrhea and congestion present. No mucosal edema or nasal deformity. No epistaxis. Right sinus exhibits no maxillary sinus tenderness and no frontal sinus tenderness. Left sinus exhibits no maxillary sinus tenderness and no frontal sinus tenderness.   Mouth/Throat: Uvula is midline, oropharynx is clear and moist and mucous membranes are normal. No trismus in the jaw. Normal dentition. No uvula swelling. No oropharyngeal exudate, posterior oropharyngeal edema or posterior oropharyngeal erythema.   Refusing cerumen impaction removal today.       Comments: Refusing cerumen impaction removal today.   Eyes: Conjunctivae and lids are normal. No scleral icterus.   Neck: Trachea normal and phonation normal. Neck supple. No edema present. No erythema present. No neck rigidity present.   Cardiovascular: Normal rate, regular rhythm, normal heart sounds and normal pulses.   Pulmonary/Chest: Effort normal and breath sounds normal. No stridor. No respiratory distress. She has no decreased breath sounds. She has no wheezes. She has no rhonchi. She has no rales.   Abdominal: Normal appearance.   Musculoskeletal: Normal range of motion.         General: No deformity. Normal range of motion.   Neurological: She is alert and oriented to person, place, and time. She exhibits normal muscle tone. Coordination normal.   Skin: Skin is warm, dry, intact, not diaphoretic and not pale.   Psychiatric: Her speech is normal and behavior is normal. Judgment and thought content normal.   Nursing note and vitals reviewed.      Assessment:       1. Viral URI with cough    2. Sinus congestion    3. Acute cough          Plan:         Viral URI with cough    Sinus congestion  -     Cancel: POCT COVID-19 Rapid Screening    Acute cough  -     benzonatate (TESSALON) 200 MG capsule; Take 1 capsule (200 mg total)  by mouth 3 (three) times daily as needed for Cough.  Dispense: 30 capsule; Refill: 0                 Patient Instructions   - Flnoase and Astelin for nasal congestion and post nasal drip.   - Cetrizine for congestion.  - Benzonatate for cough.     - You must understand that you have received an Urgent Care treatment only and that you may be released before all of your medical problems are known or treated.   - You, the patient, will arrange for follow up care as instructed.   - If your condition worsens or fails to improve we recommend that you receive another evaluation at the ER immediately or contact your PCP to discuss your concerns or return here.   - Follow up with your PCP or specialty clinic as directed in the next 1-2 weeks if not improved or as needed.  You can call [phone] to schedule an appointment with the appropriate provider.    If your symptoms do not improve or worsen, go to the emergency room immediately.

## 2022-12-08 ENCOUNTER — PATIENT OUTREACH (OUTPATIENT)
Dept: ADMINISTRATIVE | Facility: HOSPITAL | Age: 72
End: 2022-12-08
Payer: MEDICARE

## 2023-02-22 ENCOUNTER — PATIENT OUTREACH (OUTPATIENT)
Dept: ADMINISTRATIVE | Facility: HOSPITAL | Age: 73
End: 2023-02-22
Payer: MEDICARE

## 2023-02-22 NOTE — PROGRESS NOTES
Health Maintenance Due   Topic Date Due    Hepatitis C Screening  Never done    Pneumococcal Vaccines (Age 65+) (1 - PCV) Never done    TETANUS VACCINE  Never done    Shingles Vaccine (1 of 2) Never done    Foot Exam  01/06/2018    Mammogram  01/06/2018    Diabetes Urine Screening  05/03/2018    Eye Exam  05/05/2018    DEXA Scan  01/06/2020    COVID-19 Vaccine (3 - Booster for Pfizer series) 01/26/2022    Influenza Vaccine (1) Never done    Hemoglobin A1c  01/15/2023     Triggered LINKS and reconciled immunizations. Updated Care Everywhere. Chart review completed as part of PH[facility] Attestation report.

## 2023-03-07 ENCOUNTER — OFFICE VISIT (OUTPATIENT)
Dept: URGENT CARE | Facility: CLINIC | Age: 73
End: 2023-03-07
Payer: MEDICARE

## 2023-03-07 VITALS
SYSTOLIC BLOOD PRESSURE: 160 MMHG | OXYGEN SATURATION: 98 % | WEIGHT: 154 LBS | RESPIRATION RATE: 16 BRPM | BODY MASS INDEX: 24.75 KG/M2 | HEART RATE: 61 BPM | TEMPERATURE: 98 F | DIASTOLIC BLOOD PRESSURE: 72 MMHG | HEIGHT: 66 IN

## 2023-03-07 DIAGNOSIS — H61.22 IMPACTED CERUMEN OF LEFT EAR: ICD-10-CM

## 2023-03-07 DIAGNOSIS — R42 VERTIGO: Primary | ICD-10-CM

## 2023-03-07 PROCEDURE — 93010 ELECTROCARDIOGRAM REPORT: CPT | Mod: S$PBB,,, | Performed by: INTERNAL MEDICINE

## 2023-03-07 PROCEDURE — 99213 OFFICE O/P EST LOW 20 MIN: CPT | Mod: S$GLB,,,

## 2023-03-07 PROCEDURE — 93005 ELECTROCARDIOGRAM TRACING: CPT | Mod: S$GLB,,,

## 2023-03-07 PROCEDURE — 93005 EKG 12-LEAD: ICD-10-PCS | Mod: S$GLB,,,

## 2023-03-07 PROCEDURE — 93010 EKG 12-LEAD: ICD-10-PCS | Mod: S$PBB,,, | Performed by: INTERNAL MEDICINE

## 2023-03-07 PROCEDURE — 99213 PR OFFICE/OUTPT VISIT, EST, LEVL III, 20-29 MIN: ICD-10-PCS | Mod: S$GLB,,,

## 2023-03-07 RX ORDER — MECLIZINE HYDROCHLORIDE 25 MG/1
25 TABLET ORAL 3 TIMES DAILY PRN
Qty: 30 TABLET | Refills: 0 | Status: SHIPPED | OUTPATIENT
Start: 2023-03-07

## 2023-03-07 NOTE — PROGRESS NOTES
"Subjective:       Patient ID: Ninfa Philip is a 72 y.o. female.    Vitals:  height is 5' 6" (1.676 m) and weight is 69.9 kg (154 lb). Her temperature is 98.3 °F (36.8 °C). Her blood pressure is 160/72 (abnormal) and her pulse is 61. Her respiration is 16 and oxygen saturation is 98%.     Chief Complaint: Otalgia    72-year-old female complain of left ear pressure with vertigo x5 days.  Patient reports she had the symptoms before back in 2021 and was prescribed meclizine which immediately resolved her symptoms.  Patient has excessive ear wax to left ear.  Patient reports she put over-the-counter ear drops to soften her ear wax prior to come in the clinic.  Offered patient to have ears flushed in the clinic but patient refused.  Patient reports flushing ears is very painful and she will rather clean it out at home.  Patient denies any chest pain, shortness of breath, GI complaints or any other associated symptoms.    Dizziness:   Chronicity:  Recurrent  Onset:  1 to 4 weeks ago (1 week)  Frequency:  Hourly  Dizziness characteristics:  Off-balance   Associated symptoms: ear congestion.no hearing loss, no ear pain, no headaches, no tinnitus, no nausea, no vomiting, no weakness, no visual disturbances, no light-headedness, no facial weakness, no slurred speech, no numbness in extremities and no chest pain.  Aggravated by:  Bending, getting up and position changes  Treatments tried:  Nothing  Improvements on treatment:  Mildno environmental allergies.    Constitution: Negative for activity change, appetite change and chills.   HENT:  Negative for ear pain, ear discharge, foreign body in ear, tinnitus and hearing loss.    Neck: Negative for neck pain, neck stiffness and painful lymph nodes.   Cardiovascular:  Negative for chest trauma and chest pain.   Eyes:  Negative for eye trauma, foreign body in eye, eye discharge and eye itching.   Respiratory:  Negative for sleep apnea, chest tightness and cough.  "   Gastrointestinal:  Negative for abdominal trauma, abdominal pain, abdominal bloating, history of abdominal surgery, nausea and vomiting.   Endocrine: hair loss and heat intolerance.   Genitourinary:  Negative for dysuria, frequency and urgency.   Musculoskeletal:  Negative for pain, trauma, joint pain, joint swelling and abnormal ROM of joint.   Skin:  Negative for color change, pale, rash and wound.   Allergic/Immunologic: Negative for environmental allergies, seasonal allergies and food allergies.   Neurological:  Positive for history of vertigo. Negative for light-headedness, headaches, disorientation and altered mental status.   Hematologic/Lymphatic: Negative for swollen lymph nodes and easy bruising/bleeding. Does not bruise/bleed easily.   Psychiatric/Behavioral:  Negative for altered mental status and disorientation.      Objective:      Physical Exam   Constitutional: She is oriented to person, place, and time. She appears well-developed.   HENT:   Head: Normocephalic and atraumatic.   Ears:   Right Ear: External ear normal.   Left Ear: External ear normal. There is cerumen present.   Nose: Nose normal.   Mouth/Throat: Mucous membranes are normal.   Eyes: Conjunctivae and lids are normal.   Neck: Trachea normal. Neck supple.   Cardiovascular: Normal rate, regular rhythm and normal heart sounds.   Pulmonary/Chest: Effort normal and breath sounds normal. No respiratory distress.   Abdominal: Normal appearance and bowel sounds are normal. She exhibits no distension and no mass. Soft. There is no abdominal tenderness.   Musculoskeletal: Normal range of motion.         General: Normal range of motion.   Neurological: She is alert and oriented to person, place, and time. She has normal strength.   Skin: Skin is warm, dry, intact, not diaphoretic and not pale.   Psychiatric: Her speech is normal and behavior is normal. Judgment and thought content normal.   Nursing note and vitals reviewed.      Assessment:        1. Vertigo    2. Impacted cerumen of left ear          Plan:       Patient Instructions   Take meclizine 3 times daily as needed for your vertigo.  Apply Debrox to left ear to soften where ear wax and clean it daily.  Follow-up with ENT as needed if no resolve her symptoms are worsens.    - Rest.    - Drink plenty of fluids.    - Acetaminophen (tylenol) or Ibuprofen (advil,motrin) as directed as needed for fever/pain. Avoid tylenol if you have a history of liver disease. Do not take ibuprofen if you have a history of GI bleeding, kidney disease, or if you take blood thinners.     - Follow up with your PCP or specialty clinic as directed in the next 1-2 weeks if not improved or as needed.  You can call [phone] to schedule an appointment with the appropriate provider.    - Go to the ER or seek medical attention immediately if you develop new or worsening symptoms.     - You must understand that you have received an Urgent Care treatment only and that you may be released before all of your medical problems are known or treated.   - You, the patient, will arrange for follow up care as instructed.   - If your condition worsens or fails to improve we recommend that you receive another evaluation at the ER immediately or contact your PCP to discuss your concerns or return here.      Vertigo  -     IN OFFICE EKG 12-LEAD (to Muse)  -     meclizine (ANTIVERT) 25 mg tablet; Take 1 tablet (25 mg total) by mouth 3 (three) times daily as needed for Dizziness.  Dispense: 30 tablet; Refill: 0  -     Ambulatory referral/consult to ENT    Impacted cerumen of left ear  -     carbamide peroxide (DEBROX) 6.5 % otic solution; Place 5 drops into the left ear as needed.  Dispense: 15 mL; Refill: 0  -     Ambulatory referral/consult to ENT

## 2023-03-07 NOTE — PATIENT INSTRUCTIONS
Take meclizine 3 times daily as needed for your vertigo.  Apply Debrox to left ear to soften where ear wax and clean it daily.  Follow-up with ENT as needed if no resolve her symptoms are worsens.    - Rest.    - Drink plenty of fluids.    - Acetaminophen (tylenol) or Ibuprofen (advil,motrin) as directed as needed for fever/pain. Avoid tylenol if you have a history of liver disease. Do not take ibuprofen if you have a history of GI bleeding, kidney disease, or if you take blood thinners.     - Follow up with your PCP or specialty clinic as directed in the next 1-2 weeks if not improved or as needed.  You can call [phone] to schedule an appointment with the appropriate provider.    - Go to the ER or seek medical attention immediately if you develop new or worsening symptoms.     - You must understand that you have received an Urgent Care treatment only and that you may be released before all of your medical problems are known or treated.   - You, the patient, will arrange for follow up care as instructed.   - If your condition worsens or fails to improve we recommend that you receive another evaluation at the ER immediately or contact your PCP to discuss your concerns or return here.

## 2023-04-13 ENCOUNTER — PATIENT OUTREACH (OUTPATIENT)
Dept: ADMINISTRATIVE | Facility: HOSPITAL | Age: 73
End: 2023-04-13
Payer: MEDICARE

## 2023-04-19 NOTE — PATIENT INSTRUCTIONS
"[facility]  OCCUPATIONAL THERAPY  HOME EXERCISE PROGRAM     Complete the following strengthening program 2-3x/week                "donut" - thumb and index        _                  - Thumb's up - dig thumb into putty and pull up      DIANNE Anguiano, SADAFT  Certified Hand Therapist  Occupational Therapist          "
eval and treat

## 2023-10-30 ENCOUNTER — OFFICE VISIT (OUTPATIENT)
Dept: URGENT CARE | Facility: CLINIC | Age: 73
End: 2023-10-30
Payer: MEDICARE

## 2023-10-30 VITALS
HEART RATE: 54 BPM | BODY MASS INDEX: 24.75 KG/M2 | HEIGHT: 66 IN | TEMPERATURE: 98 F | DIASTOLIC BLOOD PRESSURE: 70 MMHG | RESPIRATION RATE: 16 BRPM | WEIGHT: 154 LBS | SYSTOLIC BLOOD PRESSURE: 140 MMHG | OXYGEN SATURATION: 96 %

## 2023-10-30 DIAGNOSIS — R42 VERTIGO: ICD-10-CM

## 2023-10-30 DIAGNOSIS — H61.22 LEFT EAR IMPACTED CERUMEN: ICD-10-CM

## 2023-10-30 DIAGNOSIS — R42 DIZZINESS: Primary | ICD-10-CM

## 2023-10-30 PROCEDURE — 99214 OFFICE O/P EST MOD 30 MIN: CPT | Mod: S$GLB,,, | Performed by: FAMILY MEDICINE

## 2023-10-30 PROCEDURE — 93005 EKG 12-LEAD: ICD-10-PCS | Mod: S$GLB,,, | Performed by: FAMILY MEDICINE

## 2023-10-30 PROCEDURE — 93005 ELECTROCARDIOGRAM TRACING: CPT | Mod: S$GLB,,, | Performed by: FAMILY MEDICINE

## 2023-10-30 PROCEDURE — 93010 ELECTROCARDIOGRAM REPORT: CPT | Mod: S$GLB,,, | Performed by: INTERNAL MEDICINE

## 2023-10-30 PROCEDURE — 93010 EKG 12-LEAD: ICD-10-PCS | Mod: S$GLB,,, | Performed by: INTERNAL MEDICINE

## 2023-10-30 PROCEDURE — 99214 PR OFFICE/OUTPT VISIT, EST, LEVL IV, 30-39 MIN: ICD-10-PCS | Mod: S$GLB,,, | Performed by: FAMILY MEDICINE

## 2023-10-30 RX ORDER — ATORVASTATIN CALCIUM 40 MG/1
TABLET, FILM COATED ORAL
COMMUNITY
Start: 2023-08-23

## 2023-10-30 RX ORDER — ERGOCALCIFEROL 1.25 MG/1
50000 CAPSULE ORAL
COMMUNITY
Start: 2023-03-21

## 2023-10-30 RX ORDER — METFORMIN HYDROCHLORIDE 500 MG/1
1 TABLET, EXTENDED RELEASE ORAL DAILY
COMMUNITY
Start: 2023-04-24 | End: 2024-04-23

## 2023-10-30 RX ORDER — DICLOFENAC SODIUM 10 MG/G
2 GEL TOPICAL 2 TIMES DAILY PRN
COMMUNITY
Start: 2023-06-03

## 2023-10-30 RX ORDER — DIAZEPAM 10 MG/1
10 TABLET ORAL EVERY 8 HOURS PRN
COMMUNITY
Start: 2023-06-01

## 2023-10-30 RX ORDER — CARVEDILOL 25 MG/1
25 TABLET ORAL
COMMUNITY
Start: 2023-03-21 | End: 2024-03-15

## 2023-10-30 NOTE — PROGRESS NOTES
"Subjective:      Patient ID: Ninfa Philip is a 73 y.o. female.    Vitals:  height is 5' 6" (1.676 m) and weight is 69.9 kg (154 lb). Her oral temperature is 98 °F (36.7 °C). Her blood pressure is 140/70 (abnormal) and her pulse is 54 (abnormal). Her respiration is 16 and oxygen saturation is 96%.     Chief Complaint: Dizziness    Pt presents dizziness possibly from vertigo. Pt states that she has vertigo symptoms about twice a year.  Sx began 4 days ago.  Symptoms are intermittent and worse when she turns her head to the left.  Pt felt the worst 1-2 days ago. Pt has meclizine and diazepam at home from previous visits.  Pt feels most dizzy when she's lying in her left side and gets up. Pt mentions of feeling slight left ear pain.  She denies any change in hearing or headache or numbness tingling weakness or gait disturbance.  Of note patient has a lower pulse rate here in clinic.  *I asked pt if she usually has a low pulse, pt states she has never been told that she does.  She is on beta-blocker, carvedilol for which she thinks she takes for hypertension.  She states at her last visit a few months ago they increased her carvedilol from 1 to 2 tabs daily.        Neurological:  Positive for dizziness.        EKG: NSR 48 bpm with 1st degree block  Objective:     Physical Exam  Constitutional: Pt oriented to person, place, and time.  Non-toxic appearance.   Patient does not appear ill. No distress. normal  HENT: No icterus or facial swelling appreciated  Head: Normocephalic and atraumatic.   Nose: No congestion.   Pulmonary/Chest: Effort normal. No stridor. No respiratory distress.   Abdominal: Normal appearance. Abdomen exhibits no distension.   Musculoskeletal:         General: No swelling.   Neurological: no focal deficit. Patient is alert and oriented to person, place, and time. 5/5 UE/LE b/l. NML finger to nose, nml gait  + avery halpike with transient horizontal nystagmus when head rotated to left and pt " reported symptoms of dizziness.  Skin: Skin is not diaphoretic and not pale. no jaundice  Psychiatric: Patients behavior is normal. Mood, judgment and thought content normal.     Assessment:     1. Dizziness    2. Left ear impacted cerumen    3. Vertigo        Plan:       Dizziness  -     IN OFFICE EKG 12-LEAD (to Muse)  -     Orthostatic vital signs  -     POCT Glucose, Hand-Held Device  -     Ambulatory referral/consult to ENT    Left ear impacted cerumen  -     Ambulatory referral/consult to ENT    Vertigo  -     Ambulatory referral/consult to ENT      Patient Instructions   Your dizziness sounds like vertigo episode.   However, it may be made worse by the low heart rate you are experiencing.     Since you are taking a medication that can lower your heart rate called carvedilol and since it was increased not that long ago I suspect that this could be the cause for your lower heart rate.      I recommend backing down on your carvedilol dosage to the 1 tab a day from the 2 tabs a day currently taking and reaching out to your primary care (physician letting them know of this (possibly temporary) change and I recommend having a close follow up with them.  I do recommend that you take with you the EKG that was completed here in clinic since they may not have access to our records.      As for continued treatment and follow up for the vertigo symptoms I do recommend that you continue to take the diazepam/Valium every 8 hours (which is about 3 times a day) as needed for the vertigo symptoms.      You can also complete the Epley maneuver exercises which are included in his handout.      Follow up closely with an ENT specialist.  Referral has been placed and an appointment has been made however you can call the  line at   [phone] periodically if not daily for a sooner appointment that opens up.      Please seek medical attention in nearest Emergency Department if experiencing any worsening or worrisome  symptoms, including worsening of the vertigo or any associated numbness or tingling or weakness in the extremities or slurring of speech or change in hearing or vision or chest pain or shortness a breath or any other worrisome symptoms

## 2023-10-30 NOTE — PATIENT INSTRUCTIONS
Your dizziness sounds like vertigo episode.   However, it may be made worse by the low heart rate you are experiencing.     Since you are taking a medication that can lower your heart rate called carvedilol and since it was increased not that long ago I suspect that this could be the cause for your lower heart rate.      I recommend backing down on your carvedilol dosage to the 1 tab a day from the 2 tabs a day currently taking and reaching out to your primary care (physician letting them know of this (possibly temporary) change and I recommend having a close follow up with them.  I do recommend that you take with you the EKG that was completed here in clinic since they may not have access to our records.      As for continued treatment and follow up for the vertigo symptoms I do recommend that you continue to take the diazepam/Valium every 8 hours (which is about 3 times a day) as needed for the vertigo symptoms.      You can also complete the Epley maneuver exercises which are included in his handout.      Follow up closely with an ENT specialist.  Referral has been placed and an appointment has been made however you can call the  line at   [phone] periodically if not daily for a sooner appointment that opens up.      Please seek medical attention in nearest Emergency Department if experiencing any worsening or worrisome symptoms, including worsening of the vertigo or any associated numbness or tingling or weakness in the extremities or slurring of speech or change in hearing or vision or chest pain or shortness a breath or any other worrisome symptoms

## 2024-03-19 ENCOUNTER — OFFICE VISIT (OUTPATIENT)
Dept: URGENT CARE | Facility: CLINIC | Age: 74
End: 2024-03-19
Payer: MEDICARE

## 2024-03-19 VITALS
WEIGHT: 144 LBS | RESPIRATION RATE: 19 BRPM | SYSTOLIC BLOOD PRESSURE: 132 MMHG | DIASTOLIC BLOOD PRESSURE: 82 MMHG | HEIGHT: 66 IN | HEART RATE: 65 BPM | BODY MASS INDEX: 23.14 KG/M2 | TEMPERATURE: 99 F | OXYGEN SATURATION: 98 %

## 2024-03-19 DIAGNOSIS — M25.511 ACUTE PAIN OF RIGHT SHOULDER: Primary | ICD-10-CM

## 2024-03-19 PROCEDURE — 99213 OFFICE O/P EST LOW 20 MIN: CPT | Mod: S$GLB,,,

## 2024-03-19 PROCEDURE — 73030 X-RAY EXAM OF SHOULDER: CPT | Mod: RT,S$GLB,, | Performed by: RADIOLOGY

## 2024-03-19 NOTE — PROGRESS NOTES
"Subjective:      Patient ID: Ninfa Philip is a 73 y.o. female.    Vitals:  height is 5' 6" (1.676 m) and weight is 65.3 kg (144 lb). Her oral temperature is 98.5 °F (36.9 °C). Her blood pressure is 132/82 and her pulse is 65. Her respiration is 19 and oxygen saturation is 98%.     Chief Complaint: Shoulder Pain    Past Medical History:  No date: Allergic rhinitis  No date: CKD (chronic kidney disease) stage 3, GFR 30-59 ml/min  No date: Diabetes mellitus  No date: Diabetes mellitus, type 2  No date: Gout  No date: Heart attack      Comment:  2001, 2006  No date: High cholesterol  No date: Hypertension  No date: Myocardial infarct  No date: Normocytic anemia  No date: Osteoarthritis  No date: Sacroiliac joint dysfunction  No date: Vertigo      Pt reports that she had a fall in her apartment about 3 weeks ago. Pt reports that the tv fell on her and she hurt her right shoulder and right arm. Pt reports that the arm did not hurt right away it began hurting a week ago.     Shoulder Pain   The pain is present in the right arm and right shoulder. This is a new problem. The current episode started 1 to 4 weeks ago (a week ago). There has been a history of trauma (had a fall about 3-4 weeks ago). The problem occurs constantly. The problem has been rapidly worsening. The quality of the pain is described as aching and sharp. The pain is at a severity of 10/10. The pain is severe. Associated symptoms include a limited range of motion. Pertinent negatives include no fever, headaches, inability to bear weight, itching, joint locking, joint swelling, numbness, stiffness, tingling or visual symptoms. Exacerbated by: movement. Treatments tried: aspirin cream numbing spray , advil , tylenol arthritis, tylenol 500 mg. The treatment provided no relief. Family history does not include arthritis. Her past medical history is significant for diabetes. There is no history of Injuries to Extremity or migraines.       Constitution: " Negative for chills and fever.   Musculoskeletal:  Positive for pain, trauma, joint pain and abnormal ROM of joint. Negative for joint swelling.   Neurological:  Negative for headaches and numbness.      Objective:     Physical Exam   Constitutional: She is oriented to person, place, and time. She appears well-developed. She is cooperative. No distress.   HENT:   Head: Normocephalic and atraumatic.   Nose: Nose normal.   Mouth/Throat: Oropharynx is clear and moist and mucous membranes are normal.   Eyes: Conjunctivae and lids are normal.   Neck: Trachea normal and phonation normal. Neck supple.   Cardiovascular: Normal rate, regular rhythm, normal heart sounds and normal pulses.   Pulmonary/Chest: Effort normal and breath sounds normal.   Abdominal: Normal appearance and bowel sounds are normal. She exhibits no mass. Soft.   Musculoskeletal:         General: No deformity.      Right shoulder: She exhibits decreased range of motion, tenderness and decreased strength. She exhibits no swelling, no effusion, no deformity and normal pulse.   Neurological: She is alert and oriented to person, place, and time. She has normal strength and normal reflexes. No sensory deficit.   Skin: Skin is warm, dry, intact and not diaphoretic.   Psychiatric: Her speech is normal and behavior is normal. Judgment and thought content normal.   Nursing note and vitals reviewed.      Assessment:     1. Acute pain of right shoulder        Plan:     EXAMINATION:  XR SHOULDER COMPLETE 2 OR MORE VIEWS RIGHT     TECHNIQUE:  Four views of the right shoulder were obtained.     COMPARISON:  No relevant comparison examinations are currently available.  Clinical information obtained from the electronic medical record indicates pain, with a remote history of trauma occurring approximately 3 weeks previously mentioned.     FINDINGS:  Visualized osseous structures appear intact, with no evidence of recent or healing fracture, lytic destructive process, or  appreciable glenohumeral arthritic change observed.  No glenohumeral dislocation.  No abnormal soft tissue calcifications.     Impression:     No significant abnormality.        Electronically signed by: Mitchell Cardenas MD  Date:                                            03/19/2024  Time:                                           12:07      Acute pain of right shoulder  -     XR SHOULDER COMPLETE 2 OR MORE VIEWS RIGHT; Future; Expected date: 03/19/2024  -     SLING FOR HOME USE  -     Ambulatory referral/consult to Orthopedics            Discussed results/diagnosis/plan with patient in clinic. Strict precautions given to patient to monitor for worsening signs and symptoms. Advised to follow up with PCP or specialist.  Explained side effects of medications prescribed with patient and informed him/her to discontinue use if he/she has any side effects and to inform UC or PCP if this occurs. All questions answered. Strict ED verses clinic return precautions stressed and given in depth. Advised if symptoms worsens of fail to improve he/she should go to the Emergency Room. Discharge and follow-up instructions given verbally/printed with the patient who expressed understanding and willingness to comply with my recommendations. Patient voiced understanding and in agreement with current treatment plan. Patient exits the exam room in no acute distress. Conversant and engaged during discharge discussion, verbalized understanding.

## 2024-03-19 NOTE — PATIENT INSTRUCTIONS
Wear the sling for immobilization. Rest the affected painful area.  Avoid activities that cause pain. Apply cold compresses intermittently as needed.  As pain recedes, begin normal activities slowly as tolerated.      Try extra strength tylenol (650-1000 mg) every 8 hours as needed for pain relief.      Follow up with ortho. A referral was placed for you, call [phone] to schedule appointment.

## 2024-06-13 ENCOUNTER — OFFICE VISIT (OUTPATIENT)
Dept: URGENT CARE | Facility: CLINIC | Age: 74
End: 2024-06-13
Payer: MEDICARE

## 2024-06-13 VITALS
HEART RATE: 52 BPM | TEMPERATURE: 98 F | WEIGHT: 144 LBS | BODY MASS INDEX: 23.14 KG/M2 | DIASTOLIC BLOOD PRESSURE: 62 MMHG | HEIGHT: 66 IN | RESPIRATION RATE: 18 BRPM | OXYGEN SATURATION: 99 % | SYSTOLIC BLOOD PRESSURE: 100 MMHG

## 2024-06-13 DIAGNOSIS — R03.1 LOW BLOOD PRESSURE READING: ICD-10-CM

## 2024-06-13 DIAGNOSIS — B34.9 VIRAL SYNDROME: ICD-10-CM

## 2024-06-13 DIAGNOSIS — J06.9 VIRAL URI WITH COUGH: Primary | ICD-10-CM

## 2024-06-13 LAB
CTP QC/QA: YES
SARS-COV-2 AG RESP QL IA.RAPID: NEGATIVE

## 2024-06-13 PROCEDURE — 87811 SARS-COV-2 COVID19 W/OPTIC: CPT | Mod: QW,S$GLB,, | Performed by: PHYSICIAN ASSISTANT

## 2024-06-13 PROCEDURE — 99214 OFFICE O/P EST MOD 30 MIN: CPT | Mod: S$GLB,,, | Performed by: PHYSICIAN ASSISTANT

## 2024-06-13 PROCEDURE — 71046 X-RAY EXAM CHEST 2 VIEWS: CPT | Mod: S$GLB,,, | Performed by: RADIOLOGY

## 2024-06-13 RX ORDER — BENZONATATE 200 MG/1
200 CAPSULE ORAL 3 TIMES DAILY PRN
Qty: 30 CAPSULE | Refills: 0 | Status: SHIPPED | OUTPATIENT
Start: 2024-06-13 | End: 2024-06-23

## 2024-06-13 RX ORDER — AZELASTINE 1 MG/ML
1 SPRAY, METERED NASAL 2 TIMES DAILY
Qty: 30 ML | Refills: 0 | Status: SHIPPED | OUTPATIENT
Start: 2024-06-13 | End: 2024-07-13

## 2024-06-13 RX ORDER — METHYLPREDNISOLONE 4 MG/1
4 TABLET ORAL
COMMUNITY

## 2024-06-13 NOTE — PATIENT INSTRUCTIONS
Your blood pressure today was low-likely from not eating and drinking as much as usual while still taking the blood pressure medications-make sure to eat frequent meals, hydrate, you may need to cut dose of amlodipine in half-5 mg instead of 10 mg if blood pressure remains low, or even hold completely until blood pressure rebounds.  Monitor closely at home, checking multiple times a day, and keep a log and follow up closely with primary care provider.    - Rest.    - Drink plenty of fluids.  - Viral upper respiratory infections typically run their course in 10-14 days.     - Tylenol (acetaminophen) or Ibuprofen as directed as needed for fever/pain. Avoid tylenol if you have a history of liver disease. Do not take ibuprofen if you have a history of GI bleeding, kidney disease, gastric surgery, or if you take blood thinners.     - You can take over-the-counter claritin, zyrtec, allegra, or xyzal as directed. These are antihistamines that can help with runny nose, nasal congestion, sneezing, and helps to dry up post-nasal drip, which usually causes sore throat and cough.   - If you do NOT have high blood pressure, you may use a decongestant form (D)  of this medication (ie. Claritin- D, zyrtec-D, allegra-D) or if you do not take the D form, you can take sudafed (pseudoephedrine) over the counter, which is a decongestant. Do NOT take two decongestant (D) medications at the same time (such as mucinex-D and claritin-D or plain sudafed and claritin D)    - you can take plain OTC Mucinex (guaifenesin)  twice a day to help loosen mucous.     -warm salt water gargles can help with sore throat    - warm tea with honey can help with cough. Honey is a natural cough suppressant.    - Take the tessalon (benzonatate) as needed as prescribed for cough     - Follow up with your PCP or specialty clinic as directed in the next 1-2 weeks if not improved or as needed.  You can call [phone] to schedule an appointment with the  appropriate provider.      - Go to the ER if you develop new or worsening symptoms.     - You must understand that you have received an Urgent Care treatment only and that you may be released before all of your medical problems are known or treated.   - You, the patient, will arrange for follow up care as instructed.   - If your condition worsens or fails to improve we recommend that you receive another evaluation at the ER immediately or contact your PCP to discuss your concerns or return here.

## 2024-06-13 NOTE — PROGRESS NOTES
"Subjective:      Patient ID: Ninfa Philip is a 73 y.o. female.    Vitals:  height is 5' 6" (1.676 m) and weight is 65.3 kg (144 lb). Her oral temperature is 98.3 °F (36.8 °C). Her blood pressure is 100/62 and her pulse is 52 (abnormal). Her respiration is 18 and oxygen saturation is 99%.     Chief Complaint: Cough    74 yo female who presents today with chief complaint of a cough, rhinorrhea, chills, fatigue, diarrhea, decreased appetite.  Patient states that 10 days ago she had diarrhea, fatigue, chills for 2 days then symptoms resolved fully and felt fine for several days.  Patient states that four days ago she began experiencing a productive cough as well as fatigue, chills, rhinorrhea, and also had diarrhea again for 2 days, which has resolved.  No vomiting.  No abdominal pain or urinary symptoms.  No fever.  No CP or dyspnea.  Pt reports decreased sense of taste and smell. Pt reports ear fullness in both ears, not currently painful but feels congested, like she's underwater.  She states that she has been sipping Sprite but has had a good appetite, so p.o. intake has been low.  Of note, she is seeing orthopedics for shoulder pain, she is going to start medrol dose pack by ortho today.     Cough  This is a new problem. The current episode started in the past 7 days. The problem has been gradually worsening. The problem occurs constantly. The cough is Productive of sputum. Associated symptoms include chills, ear congestion and postnasal drip. Pertinent negatives include no chest pain, ear pain, eye redness, fever, headaches, heartburn, hemoptysis, myalgias, nasal congestion, rash, rhinorrhea, sore throat, shortness of breath, sweats, weight loss or wheezing. Nothing aggravates the symptoms. She has tried OTC cough suppressant (azestaline, mucinex, pepto) for the symptoms. The treatment provided mild relief. Her past medical history is significant for pneumonia. There is no history of asthma, bronchiectasis, " bronchitis, COPD, emphysema or environmental allergies.       Constitution: Positive for appetite change, chills and fatigue. Negative for sweating and fever.   HENT:  Positive for congestion and postnasal drip. Negative for ear pain and sore throat.    Neck: Negative for neck pain and neck stiffness.   Cardiovascular:  Negative for chest pain, leg swelling and palpitations.   Eyes:  Negative for eye itching, eye pain and eye redness.   Respiratory:  Positive for cough and sputum production. Negative for chest tightness, bloody sputum, shortness of breath and wheezing.    Gastrointestinal:  Negative for abdominal pain, nausea, vomiting, diarrhea and heartburn.   Genitourinary:  Negative for dysuria, frequency and urgency.   Musculoskeletal:  Negative for pain, joint swelling and muscle ache.   Skin:  Negative for color change and rash.   Allergic/Immunologic: Negative for environmental allergies.   Neurological:  Negative for dizziness, light-headedness, facial drooping, headaches, disorientation, altered mental status, numbness and tingling.   Psychiatric/Behavioral:  Negative for altered mental status and disorientation.       Objective:     Physical Exam   Constitutional: She is oriented to person, place, and time. She appears well-developed. She is cooperative.  Non-toxic appearance. She does not appear ill. No distress.      Comments:Very pleasant sitting comfortably in no acute distress.  Nontoxic appearing.     HENT:   Head: Normocephalic and atraumatic.   Ears:   Right Ear: Hearing, tympanic membrane, external ear and ear canal normal.   Left Ear: Hearing, tympanic membrane, external ear and ear canal normal.   Nose: Mucosal edema present. No rhinorrhea or nasal deformity. No epistaxis. Right sinus exhibits no maxillary sinus tenderness and no frontal sinus tenderness. Left sinus exhibits no maxillary sinus tenderness and no frontal sinus tenderness.   Mouth/Throat: Uvula is midline, oropharynx is clear and  moist and mucous membranes are normal. No trismus in the jaw. Normal dentition. No uvula swelling. No oropharyngeal exudate, posterior oropharyngeal edema, posterior oropharyngeal erythema, tonsillar abscesses or cobblestoning. No tonsillar exudate.   Eyes: Conjunctivae and lids are normal. No scleral icterus.   Neck: Trachea normal and phonation normal. Neck supple. No edema present. No erythema present. No neck rigidity present.   Cardiovascular: Normal rate, regular rhythm, normal heart sounds and normal pulses.   Pulmonary/Chest: Effort normal and breath sounds normal. No accessory muscle usage or stridor. No tachypnea and no bradypnea. No respiratory distress. She has no decreased breath sounds. She has no wheezes. She has no rhonchi. She has no rales.   Abdominal: Normal appearance and bowel sounds are normal. She exhibits no distension and no mass. Soft. There is no abdominal tenderness.   Musculoskeletal: Normal range of motion.         General: No deformity. Normal range of motion.   Lymphadenopathy:     She has no cervical adenopathy.   Neurological: She is alert and oriented to person, place, and time. She has normal strength. She exhibits normal muscle tone. Coordination normal.   Skin: Skin is warm, dry, intact, not diaphoretic and not pale.   Psychiatric: Her speech is normal and behavior is normal. Judgment and thought content normal.   Nursing note and vitals reviewed.      Results for orders placed or performed in visit on 06/13/24   SARS Coronavirus 2 Antigen, POCT Manual Read   Result Value Ref Range    SARS Coronavirus 2 Antigen Negative Negative     Acceptable Yes      XR CHEST PA AND LATERAL    Result Date: 6/13/2024  EXAMINATION: XR CHEST PA AND LATERAL CLINICAL HISTORY: Acute cough TECHNIQUE: PA and lateral views of the chest were performed. COMPARISON: 11/12/2023 FINDINGS: Soft tissue mass displacing the trachea toward the left, could represent an enlarged thyroid, unchanged  from 10/27/2017. The cardiac silhouette is normal in size, midline. The pulmonary vascularity is normal. The pulmonary immanuel appear normal bilaterally. The lungs are well expanded and clear. No focal airspace disease. No pleural effusion, no pneumothorax. The osseous structures appear within normal limits for age.     No acute intrathoracic process seen. Soft tissue mass displacing the trachea toward the left, unchanged from 10/27/2017, possibly an enlarged thyroid gland, palpation thyroid ultrasound recommended. 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    Assessment:     1. Viral URI with cough    2. Viral syndrome    3. Low blood pressure reading        Plan:       Viral URI with cough  -     SARS Coronavirus 2 Antigen, POCT Manual Read  -     XR CHEST PA AND LATERAL; Future; Expected date: 06/13/2024  -     azelastine (ASTELIN) 137 mcg (0.1 %) nasal spray; 1 spray (137 mcg total) by Nasal route 2 (two) times daily.  Dispense: 30 mL; Refill: 0  -     benzonatate (TESSALON) 200 MG capsule; Take 1 capsule (200 mg total) by mouth 3 (three) times daily as needed for Cough.  Dispense: 30 capsule; Refill: 0    Viral syndrome    Low blood pressure reading      Discussed likely viral etiology of symptoms. Discussed ddx, home care, tx options, and given follow up precautions.  I have reviewed the patient's chart to view previous visits, labs, and imaging to assess PMH and look for any trends or previous treatments.  Discussed in detail with patient the removed blood pressure reading in clinic today.  Patient her rate is frequently low, on beta-blocker.  However, blood pressure is not usually low.  Patient is generally well-appearing on exam, no fever.  Instructed to increase fluids, monitor blood pressure closely at home, may need to decrease or hold amlodipine if it is consistently low as she  has not had adequate p.o. intake over the past few days.  I see no sign of bacterial complication based on exam, but I have given strict ER precautions for any new or worsening symptoms, otherwise follow up closely with PCP.  Patient expresses understanding, agrees with plan.

## 2025-05-05 ENCOUNTER — PATIENT OUTREACH (OUTPATIENT)
Dept: ADMINISTRATIVE | Facility: CLINIC | Age: 75
End: 2025-05-05
Payer: MEDICARE

## 2025-05-05 NOTE — PROGRESS NOTES
C3 nurse attempted to contact Ninfa Philip for a TCC post hospital discharge follow up call. No answer. LVM requesting a callback at [phone]. The patient does not have a scheduled HOSFU appointment. Patient does not have an [facility] PCP.

## 2025-05-06 NOTE — PROGRESS NOTES
2nd attempt C3 nurse attempted to contact Ninfa Philip for a TCC post hospital discharge follow up call. No answer. LVM requesting a callback at [phone]. The patient does not have a scheduled HOSFU appointment. Patient does not have an [facility] PCP.

## 2025-05-07 NOTE — PROGRESS NOTES
3rd attempt C3 nurse attempted to contact Ninfa Philip for a TCC post hospital discharge follow up call. No answer. LVM requesting a callback at [phone]. The patient does not have a scheduled HOSFU appointment. Patient does not [facility] P[facility].